# Patient Record
Sex: FEMALE | Race: OTHER | HISPANIC OR LATINO | URBAN - METROPOLITAN AREA
[De-identification: names, ages, dates, MRNs, and addresses within clinical notes are randomized per-mention and may not be internally consistent; named-entity substitution may affect disease eponyms.]

---

## 2017-11-22 ENCOUNTER — OUTPATIENT (OUTPATIENT)
Dept: OUTPATIENT SERVICES | Facility: HOSPITAL | Age: 32
LOS: 1 days | End: 2017-11-22

## 2017-11-22 VITALS
SYSTOLIC BLOOD PRESSURE: 100 MMHG | HEART RATE: 76 BPM | WEIGHT: 128.09 LBS | RESPIRATION RATE: 16 BRPM | TEMPERATURE: 98 F | DIASTOLIC BLOOD PRESSURE: 70 MMHG | HEIGHT: 64 IN

## 2017-11-22 DIAGNOSIS — M20.12 HALLUX VALGUS (ACQUIRED), LEFT FOOT: ICD-10-CM

## 2017-11-22 DIAGNOSIS — Z98.89 OTHER SPECIFIED POSTPROCEDURAL STATES: Chronic | ICD-10-CM

## 2017-11-22 DIAGNOSIS — Z98.890 OTHER SPECIFIED POSTPROCEDURAL STATES: Chronic | ICD-10-CM

## 2017-11-22 DIAGNOSIS — M21.619 BUNION OF UNSPECIFIED FOOT: ICD-10-CM

## 2017-11-22 LAB
BUN SERPL-MCNC: 14 MG/DL — SIGNIFICANT CHANGE UP (ref 7–23)
CALCIUM SERPL-MCNC: 9.4 MG/DL — SIGNIFICANT CHANGE UP (ref 8.4–10.5)
CHLORIDE SERPL-SCNC: 102 MMOL/L — SIGNIFICANT CHANGE UP (ref 98–107)
CO2 SERPL-SCNC: 24 MMOL/L — SIGNIFICANT CHANGE UP (ref 22–31)
CREAT SERPL-MCNC: 0.82 MG/DL — SIGNIFICANT CHANGE UP (ref 0.5–1.3)
GLUCOSE SERPL-MCNC: 86 MG/DL — SIGNIFICANT CHANGE UP (ref 70–99)
HCT VFR BLD CALC: 41.2 % — SIGNIFICANT CHANGE UP (ref 34.5–45)
HGB BLD-MCNC: 13.9 G/DL — SIGNIFICANT CHANGE UP (ref 11.5–15.5)
MCHC RBC-ENTMCNC: 31.2 PG — SIGNIFICANT CHANGE UP (ref 27–34)
MCHC RBC-ENTMCNC: 33.7 % — SIGNIFICANT CHANGE UP (ref 32–36)
MCV RBC AUTO: 92.4 FL — SIGNIFICANT CHANGE UP (ref 80–100)
NRBC # FLD: 0 — SIGNIFICANT CHANGE UP
PLATELET # BLD AUTO: 245 K/UL — SIGNIFICANT CHANGE UP (ref 150–400)
PMV BLD: 11.5 FL — SIGNIFICANT CHANGE UP (ref 7–13)
POTASSIUM SERPL-MCNC: 3.8 MMOL/L — SIGNIFICANT CHANGE UP (ref 3.5–5.3)
POTASSIUM SERPL-SCNC: 3.8 MMOL/L — SIGNIFICANT CHANGE UP (ref 3.5–5.3)
RBC # BLD: 4.46 M/UL — SIGNIFICANT CHANGE UP (ref 3.8–5.2)
RBC # FLD: 12.2 % — SIGNIFICANT CHANGE UP (ref 10.3–14.5)
SODIUM SERPL-SCNC: 140 MMOL/L — SIGNIFICANT CHANGE UP (ref 135–145)
WBC # BLD: 8.27 K/UL — SIGNIFICANT CHANGE UP (ref 3.8–10.5)
WBC # FLD AUTO: 8.27 K/UL — SIGNIFICANT CHANGE UP (ref 3.8–10.5)

## 2017-11-22 NOTE — H&P PST ADULT - PSH
Inguinal hernia, right  repair, 07/2003  S/P cystoscopy  lithotripsy 2014 for right kidney stone  S/P foot surgery, right  Lapidus 2012

## 2017-11-22 NOTE — H&P PST ADULT - RS GEN PE MLT RESP DETAILS PC
no chest wall tenderness/no rales/no wheezes/airway patent/breath sounds equal/good air movement/no rhonchi/no subcutaneous emphysema/no intercostal retractions/respirations non-labored/clear to auscultation bilaterally

## 2017-11-22 NOTE — H&P PST ADULT - PROBLEM SELECTOR PLAN 1
Pt is scheduled for left foot lapidus bunionectomy, 5th metatarsal osteotomy on 12/6/17.   Pre op instructions given and pt able to verbalize understanding.   Sterile cup given, pt instructed to bring urine sample AM of surgery for UCG and pt able to verbalize understanding.

## 2017-11-22 NOTE — H&P PST ADULT - NEGATIVE CARDIOVASCULAR SYMPTOMS
no paroxysmal nocturnal dyspnea/no orthopnea/no chest pain/no palpitations/no peripheral edema/no claudication/no dyspnea on exertion

## 2017-11-22 NOTE — H&P PST ADULT - LYMPHATIC
supraclavicular R/posterior cervical R/anterior cervical R/posterior cervical L/anterior cervical L/supraclavicular L

## 2017-11-22 NOTE — H&P PST ADULT - NEGATIVE NEUROLOGICAL SYMPTOMS
no transient paralysis/no vertigo/no difficulty walking/no weakness/no generalized seizures/no loss of sensation

## 2017-11-22 NOTE — H&P PST ADULT - HISTORY OF PRESENT ILLNESS
31 yo female with PMH kidney stones presents to pre surgical testing.  Pt reports she has been experiencing right foot pain for over 20 years, progressively getting worse.  Pt reports left foot xrays done in 5/2017.  Pt is scheduled for left foot lapidus bunionectomy, 5th metatarsal osteotomy on 12/6/17.

## 2017-11-22 NOTE — H&P PST ADULT - NEGATIVE MUSCULOSKELETAL SYMPTOMS
no myalgia/no arm pain L/no arm pain R/no joint swelling/no muscle weakness/no arthralgia/no leg pain R/no muscle cramps/no stiffness/no neck pain

## 2017-11-22 NOTE — H&P PST ADULT - NSANTHOSAYNRD_GEN_A_CORE
No. FRANSISCO screening performed.  STOP BANG Legend: 0-2 = LOW Risk; 3-4 = INTERMEDIATE Risk; 5-8 = HIGH Risk

## 2017-11-22 NOTE — H&P PST ADULT - FAMILY HISTORY
Grandparent  Still living? No  Family history of diabetes mellitus, Age at diagnosis: Age Unknown  Family history of ischemic heart disease, Age at diagnosis: Age Unknown     Aunt  Still living? Yes, Estimated age: Age Unknown  Family history of diabetes mellitus, Age at diagnosis: Age Unknown

## 2017-12-06 ENCOUNTER — RESULT REVIEW (OUTPATIENT)
Age: 32
End: 2017-12-06

## 2017-12-06 ENCOUNTER — OUTPATIENT (OUTPATIENT)
Dept: OUTPATIENT SERVICES | Facility: HOSPITAL | Age: 32
LOS: 1 days | Discharge: HOME | End: 2017-12-06
Payer: COMMERCIAL

## 2017-12-06 ENCOUNTER — TRANSCRIPTION ENCOUNTER (OUTPATIENT)
Age: 32
End: 2017-12-06

## 2017-12-06 VITALS
OXYGEN SATURATION: 100 % | TEMPERATURE: 98 F | WEIGHT: 128.09 LBS | SYSTOLIC BLOOD PRESSURE: 118 MMHG | HEIGHT: 64 IN | DIASTOLIC BLOOD PRESSURE: 65 MMHG | HEART RATE: 85 BPM | RESPIRATION RATE: 14 BRPM

## 2017-12-06 VITALS
HEART RATE: 85 BPM | SYSTOLIC BLOOD PRESSURE: 104 MMHG | TEMPERATURE: 98 F | OXYGEN SATURATION: 98 % | DIASTOLIC BLOOD PRESSURE: 73 MMHG | RESPIRATION RATE: 16 BRPM

## 2017-12-06 DIAGNOSIS — Z98.89 OTHER SPECIFIED POSTPROCEDURAL STATES: Chronic | ICD-10-CM

## 2017-12-06 DIAGNOSIS — Z98.890 OTHER SPECIFIED POSTPROCEDURAL STATES: Chronic | ICD-10-CM

## 2017-12-06 DIAGNOSIS — M20.12 HALLUX VALGUS (ACQUIRED), LEFT FOOT: ICD-10-CM

## 2017-12-06 PROCEDURE — 88311 DECALCIFY TISSUE: CPT | Mod: 26

## 2017-12-06 PROCEDURE — 88304 TISSUE EXAM BY PATHOLOGIST: CPT | Mod: 26

## 2017-12-06 PROCEDURE — 73630 X-RAY EXAM OF FOOT: CPT | Mod: 26,LT

## 2017-12-06 NOTE — ASU DISCHARGE PLAN (ADULT/PEDIATRIC). - SPECIAL INSTRUCTIONS
keep dressing clean , dry and intact   non weight bearing to left foot in posterior splint with crutches

## 2017-12-06 NOTE — ASU DISCHARGE PLAN (ADULT/PEDIATRIC). - INSTRUCTIONS
for 1 week progress slowly,avoid any foods that are spicy, greasy or fatty, increase fluids and resume regular diet in am

## 2017-12-06 NOTE — BRIEF OPERATIVE NOTE - OPERATION/FINDINGS
L foot medial 1st met bumpectomy and Lapidus with 1 x 4.0 36mm fully threaded screw and 4-hole plate with 2.7 screws (16mm, 18mm, 14mm, and 10mm). L foot 5th met medial distal closing base wedge with 2.0 10mm/8mm screw fixation

## 2017-12-06 NOTE — ASU DISCHARGE PLAN (ADULT/PEDIATRIC). - NURSING INSTRUCTIONS
Narcotic pain medicine is constipating , Buy over the counter stool softener and take as instructed on the bottle.   DONOT TAKE TYLENOL WITH THE PAIN MEDICINE AS THERE IS TYLENOL IN THE PAIN MEDICINE.

## 2017-12-06 NOTE — ASU DISCHARGE PLAN (ADULT/PEDIATRIC). - NOTIFY
Unable to Urinate/Pain not relieved by Medications/Persistent Nausea and Vomiting/Bleeding that does not stop/Swelling that continues/Numbness, color, or temperature change to extremity/Fever greater than 101

## 2017-12-06 NOTE — BRIEF OPERATIVE NOTE - PROCEDURE
<<-----Click on this checkbox to enter Procedure Carlo's bunionectomy of left foot  12/06/2017    Active  ASIM L[place]ato bunionectomy of left great toe  12/06/2017    Active  ASIM

## 2017-12-12 LAB — SURGICAL PATHOLOGY STUDY: SIGNIFICANT CHANGE UP

## 2018-06-22 ENCOUNTER — EMERGENCY (EMERGENCY)
Facility: HOSPITAL | Age: 33
LOS: 1 days | Discharge: HOME | End: 2018-06-22
Attending: EMERGENCY MEDICINE
Payer: COMMERCIAL

## 2018-06-22 VITALS
SYSTOLIC BLOOD PRESSURE: 135 MMHG | HEART RATE: 105 BPM | OXYGEN SATURATION: 100 % | RESPIRATION RATE: 18 BRPM | DIASTOLIC BLOOD PRESSURE: 78 MMHG | TEMPERATURE: 99 F

## 2018-06-22 VITALS — WEIGHT: 126.1 LBS | HEIGHT: 64 IN

## 2018-06-22 DIAGNOSIS — Z98.89 OTHER SPECIFIED POSTPROCEDURAL STATES: Chronic | ICD-10-CM

## 2018-06-22 DIAGNOSIS — Z98.890 OTHER SPECIFIED POSTPROCEDURAL STATES: Chronic | ICD-10-CM

## 2018-06-22 LAB
BASOPHILS # BLD AUTO: 0 K/UL — SIGNIFICANT CHANGE UP (ref 0–0.2)
BASOPHILS NFR BLD AUTO: 0.6 % — SIGNIFICANT CHANGE UP (ref 0–2)
EOSINOPHIL # BLD AUTO: 0 K/UL — SIGNIFICANT CHANGE UP (ref 0–0.5)
EOSINOPHIL NFR BLD AUTO: 0.5 % — SIGNIFICANT CHANGE UP (ref 0–6)
HCG UR QL: NEGATIVE — SIGNIFICANT CHANGE UP
HCT VFR BLD CALC: 43.5 % — SIGNIFICANT CHANGE UP (ref 37–47)
HGB BLD-MCNC: 14.3 G/DL — SIGNIFICANT CHANGE UP (ref 12–16)
LYMPHOCYTES # BLD AUTO: 1.8 K/UL — SIGNIFICANT CHANGE UP (ref 1–4.8)
LYMPHOCYTES # BLD AUTO: 22.4 % — SIGNIFICANT CHANGE UP (ref 20–55)
MCHC RBC-ENTMCNC: 29.5 PG — SIGNIFICANT CHANGE UP (ref 27–31)
MCHC RBC-ENTMCNC: 32.9 G/DL — SIGNIFICANT CHANGE UP (ref 32–36)
MCV RBC AUTO: 89.9 FL — SIGNIFICANT CHANGE UP (ref 81–99)
MONOCYTES # BLD AUTO: 0.4 K/UL — SIGNIFICANT CHANGE UP (ref 0–0.8)
MONOCYTES NFR BLD AUTO: 5.5 % — SIGNIFICANT CHANGE UP (ref 3–10)
NEUTROPHILS # BLD AUTO: 5.7 K/UL — SIGNIFICANT CHANGE UP (ref 1.8–8)
NEUTROPHILS NFR BLD AUTO: 70.9 % — SIGNIFICANT CHANGE UP (ref 37–73)
PLATELET # BLD AUTO: 211 K/UL — SIGNIFICANT CHANGE UP (ref 150–400)
RBC # BLD: 4.84 M/UL — SIGNIFICANT CHANGE UP (ref 4.4–5.2)
RBC # FLD: 13 % — SIGNIFICANT CHANGE UP (ref 11–15.6)
WBC # BLD: 8 K/UL — SIGNIFICANT CHANGE UP (ref 4.8–10.8)
WBC # FLD AUTO: 8 K/UL — SIGNIFICANT CHANGE UP (ref 4.8–10.8)

## 2018-06-22 PROCEDURE — 96375 TX/PRO/DX INJ NEW DRUG ADDON: CPT

## 2018-06-22 PROCEDURE — 70450 CT HEAD/BRAIN W/O DYE: CPT

## 2018-06-22 PROCEDURE — 36415 COLL VENOUS BLD VENIPUNCTURE: CPT

## 2018-06-22 PROCEDURE — 85027 COMPLETE CBC AUTOMATED: CPT

## 2018-06-22 PROCEDURE — 99284 EMERGENCY DEPT VISIT MOD MDM: CPT

## 2018-06-22 PROCEDURE — 96374 THER/PROPH/DIAG INJ IV PUSH: CPT

## 2018-06-22 PROCEDURE — 99284 EMERGENCY DEPT VISIT MOD MDM: CPT | Mod: 25

## 2018-06-22 PROCEDURE — 70450 CT HEAD/BRAIN W/O DYE: CPT | Mod: 26

## 2018-06-22 PROCEDURE — 81025 URINE PREGNANCY TEST: CPT

## 2018-06-22 RX ORDER — ACETAMINOPHEN 500 MG
1 TABLET ORAL
Qty: 12 | Refills: 0 | OUTPATIENT
Start: 2018-06-22 | End: 2018-06-24

## 2018-06-22 RX ORDER — SODIUM CHLORIDE 9 MG/ML
1000 INJECTION INTRAMUSCULAR; INTRAVENOUS; SUBCUTANEOUS ONCE
Qty: 0 | Refills: 0 | Status: COMPLETED | OUTPATIENT
Start: 2018-06-22 | End: 2018-06-22

## 2018-06-22 RX ORDER — DEXAMETHASONE 0.5 MG/5ML
10 ELIXIR ORAL ONCE
Qty: 0 | Refills: 0 | Status: COMPLETED | OUTPATIENT
Start: 2018-06-22 | End: 2018-06-22

## 2018-06-22 RX ORDER — KETOROLAC TROMETHAMINE 30 MG/ML
30 SYRINGE (ML) INJECTION ONCE
Qty: 0 | Refills: 0 | Status: DISCONTINUED | OUTPATIENT
Start: 2018-06-22 | End: 2018-06-22

## 2018-06-22 RX ADMIN — SODIUM CHLORIDE 1000 MILLILITER(S): 9 INJECTION INTRAMUSCULAR; INTRAVENOUS; SUBCUTANEOUS at 16:36

## 2018-06-22 RX ADMIN — Medication 10 MILLIGRAM(S): at 16:36

## 2018-06-22 RX ADMIN — Medication 30 MILLIGRAM(S): at 16:36

## 2018-06-22 NOTE — ED PROVIDER NOTE - PROGRESS NOTE DETAILS
pt is sitting comfortably in chair and in no acute distress. pt informed of negative ct results. pt states that her headache had improved with medication given. pt is informed to follow up with neurologist. pt is sitting comfortably in chair and in no acute distress. pt informed of negative ct results. pt states that her headache has improved drastically with medication and fluids given. pt is informed to follow up with neurologist.

## 2018-06-22 NOTE — ED PROVIDER NOTE - EYES, MLM
Called and spoke with pt to let her know that Dr. Barfield would like her to start lamivudine 150 mg daily instead of entecavir. This medication should be affordable for pt. Repeat labs Mon. 10/9. Pt repeated and verbalized understanding.   Clear bilaterally, pupils equal, round and reactive to light.

## 2018-06-22 NOTE — ED PROVIDER NOTE - ATTENDING CONTRIBUTION TO CARE
seen with acp  c/o right periorbital headache for the last 3 days no nausea no vomiting  no neuro findingsimp cluster headache  will give toradol and decadron  ct negative  Headache improved  Agree with acps assessment hx and physical

## 2018-06-22 NOTE — ED PROVIDER NOTE - OBJECTIVE STATEMENT
31 yo female with a pmh of renal stones present with a headache that started on Tuesday. It started in her right orbital area and then radiated to her occipital area. She described it as a pulsatile consistent pain. She is able to have some relief only with Tylenol use. She has also been experiencing dizziness, fevers, and chills intermittently. She states to have been experiencing nausea, which has caused a decrease in her appetite, but no vomiting. She denies any injury or trauma to her head. Before the start of the headache the only thing she can note is having allergy testing done earlier that morning.  She denies any other symptoms including chill, congestion, cough, chest pain, or SOB.

## 2018-06-22 NOTE — ED ADULT NURSE NOTE - CHPI ED SYMPTOMS NEG
no confusion/no change in level of consciousness/no loss of consciousness/no blurred vision/no weakness

## 2018-06-22 NOTE — ED ADULT NURSE NOTE - CAS EDN DISCHARGE ASSESSMENT
No adverse reaction to first time med in ED/Awake/Alert and oriented to person, place and time/Patient baseline mental status/Symptoms improved

## 2018-06-22 NOTE — ED PROVIDER NOTE - MEDICAL DECISION MAKING DETAILS
31 yo female presents with headache. Labs and CT ordered to r/o infection or cerebral bleed. Pain managed with toradol and decadron.

## 2018-06-24 ENCOUNTER — EMERGENCY (EMERGENCY)
Facility: HOSPITAL | Age: 33
LOS: 1 days | Discharge: HOME | End: 2018-06-24
Attending: EMERGENCY MEDICINE
Payer: COMMERCIAL

## 2018-06-24 VITALS
SYSTOLIC BLOOD PRESSURE: 120 MMHG | TEMPERATURE: 100 F | OXYGEN SATURATION: 100 % | RESPIRATION RATE: 18 BRPM | HEART RATE: 93 BPM | DIASTOLIC BLOOD PRESSURE: 83 MMHG

## 2018-06-24 VITALS — WEIGHT: 126.1 LBS | HEIGHT: 64 IN

## 2018-06-24 DIAGNOSIS — Z98.89 OTHER SPECIFIED POSTPROCEDURAL STATES: Chronic | ICD-10-CM

## 2018-06-24 DIAGNOSIS — Z98.890 OTHER SPECIFIED POSTPROCEDURAL STATES: Chronic | ICD-10-CM

## 2018-06-24 LAB
ALBUMIN SERPL ELPH-MCNC: 4.7 G/DL — SIGNIFICANT CHANGE UP (ref 3.3–5.2)
ALP SERPL-CCNC: 50 U/L — SIGNIFICANT CHANGE UP (ref 40–120)
ALT FLD-CCNC: 11 U/L — SIGNIFICANT CHANGE UP
ANION GAP SERPL CALC-SCNC: 15 MMOL/L — SIGNIFICANT CHANGE UP (ref 5–17)
AST SERPL-CCNC: 12 U/L — SIGNIFICANT CHANGE UP
BASOPHILS # BLD AUTO: 0 K/UL — SIGNIFICANT CHANGE UP (ref 0–0.2)
BASOPHILS NFR BLD AUTO: 0.5 % — SIGNIFICANT CHANGE UP (ref 0–2)
BILIRUB SERPL-MCNC: 0.5 MG/DL — SIGNIFICANT CHANGE UP (ref 0.4–2)
BUN SERPL-MCNC: 6 MG/DL — LOW (ref 8–20)
CALCIUM SERPL-MCNC: 9.5 MG/DL — SIGNIFICANT CHANGE UP (ref 8.6–10.2)
CHLORIDE SERPL-SCNC: 104 MMOL/L — SIGNIFICANT CHANGE UP (ref 98–107)
CO2 SERPL-SCNC: 23 MMOL/L — SIGNIFICANT CHANGE UP (ref 22–29)
CREAT SERPL-MCNC: 0.65 MG/DL — SIGNIFICANT CHANGE UP (ref 0.5–1.3)
CRP SERPL-MCNC: <0.4 MG/DL — SIGNIFICANT CHANGE UP (ref 0–0.4)
EOSINOPHIL # BLD AUTO: 0 K/UL — SIGNIFICANT CHANGE UP (ref 0–0.5)
EOSINOPHIL NFR BLD AUTO: 0.4 % — SIGNIFICANT CHANGE UP (ref 0–6)
ERYTHROCYTE [SEDIMENTATION RATE] IN BLOOD: 5 MM/HR — SIGNIFICANT CHANGE UP (ref 0–20)
GLUCOSE SERPL-MCNC: 101 MG/DL — SIGNIFICANT CHANGE UP (ref 70–115)
HCT VFR BLD CALC: 39.3 % — SIGNIFICANT CHANGE UP (ref 37–47)
HGB BLD-MCNC: 12.9 G/DL — SIGNIFICANT CHANGE UP (ref 12–16)
LYMPHOCYTES # BLD AUTO: 1.3 K/UL — SIGNIFICANT CHANGE UP (ref 1–4.8)
LYMPHOCYTES # BLD AUTO: 16.3 % — LOW (ref 20–55)
MCHC RBC-ENTMCNC: 29.7 PG — SIGNIFICANT CHANGE UP (ref 27–31)
MCHC RBC-ENTMCNC: 32.8 G/DL — SIGNIFICANT CHANGE UP (ref 32–36)
MCV RBC AUTO: 90.6 FL — SIGNIFICANT CHANGE UP (ref 81–99)
MONOCYTES # BLD AUTO: 0.5 K/UL — SIGNIFICANT CHANGE UP (ref 0–0.8)
MONOCYTES NFR BLD AUTO: 6.3 % — SIGNIFICANT CHANGE UP (ref 3–10)
NEUTROPHILS # BLD AUTO: 6.1 K/UL — SIGNIFICANT CHANGE UP (ref 1.8–8)
NEUTROPHILS NFR BLD AUTO: 76.2 % — HIGH (ref 37–73)
PLATELET # BLD AUTO: 202 K/UL — SIGNIFICANT CHANGE UP (ref 150–400)
POTASSIUM SERPL-MCNC: 3.7 MMOL/L — SIGNIFICANT CHANGE UP (ref 3.5–5.3)
POTASSIUM SERPL-SCNC: 3.7 MMOL/L — SIGNIFICANT CHANGE UP (ref 3.5–5.3)
PROT SERPL-MCNC: 7.4 G/DL — SIGNIFICANT CHANGE UP (ref 6.6–8.7)
RBC # BLD: 4.34 M/UL — LOW (ref 4.4–5.2)
RBC # FLD: 13 % — SIGNIFICANT CHANGE UP (ref 11–15.6)
SODIUM SERPL-SCNC: 142 MMOL/L — SIGNIFICANT CHANGE UP (ref 135–145)
WBC # BLD: 8 K/UL — SIGNIFICANT CHANGE UP (ref 4.8–10.8)
WBC # FLD AUTO: 8 K/UL — SIGNIFICANT CHANGE UP (ref 4.8–10.8)

## 2018-06-24 PROCEDURE — 80053 COMPREHEN METABOLIC PANEL: CPT

## 2018-06-24 PROCEDURE — 86140 C-REACTIVE PROTEIN: CPT

## 2018-06-24 PROCEDURE — 86666 EHRLICHIA ANTIBODY: CPT

## 2018-06-24 PROCEDURE — 99284 EMERGENCY DEPT VISIT MOD MDM: CPT | Mod: 25

## 2018-06-24 PROCEDURE — 70544 MR ANGIOGRAPHY HEAD W/O DYE: CPT

## 2018-06-24 PROCEDURE — 70544 MR ANGIOGRAPHY HEAD W/O DYE: CPT | Mod: 26,59

## 2018-06-24 PROCEDURE — 96374 THER/PROPH/DIAG INJ IV PUSH: CPT

## 2018-06-24 PROCEDURE — 85027 COMPLETE CBC AUTOMATED: CPT

## 2018-06-24 PROCEDURE — 99284 EMERGENCY DEPT VISIT MOD MDM: CPT

## 2018-06-24 PROCEDURE — 70551 MRI BRAIN STEM W/O DYE: CPT

## 2018-06-24 PROCEDURE — 70551 MRI BRAIN STEM W/O DYE: CPT | Mod: 26

## 2018-06-24 PROCEDURE — 36415 COLL VENOUS BLD VENIPUNCTURE: CPT

## 2018-06-24 PROCEDURE — 85652 RBC SED RATE AUTOMATED: CPT

## 2018-06-24 RX ORDER — KETOROLAC TROMETHAMINE 30 MG/ML
30 SYRINGE (ML) INJECTION ONCE
Qty: 0 | Refills: 0 | Status: DISCONTINUED | OUTPATIENT
Start: 2018-06-24 | End: 2018-06-24

## 2018-06-24 RX ADMIN — Medication 30 MILLIGRAM(S): at 12:39

## 2018-06-24 NOTE — ED PROVIDER NOTE - OBJECTIVE STATEMENT
This patient is a 32 year old woman recently seen in the ER This patient is a 32 year old woman recently seen in the ER 2 days ago for headache who presents This patient is a 32 year old woman recently seen in the ER 2 days ago for headache who presents to the ER c/o persistent headache.  Associated symptoms include fever with Tmax 102 and dizziness. This patient is a 32 year old woman recently seen in the ER 2 days ago for headache who presents to the ER c/o persistent headache.  Associated symptoms include fever with Tmax 102 and dizziness.  She has been taking extra strength tylenol every 5-6 hours for pain.  The headache started 5 days ago a couple hours after allergy testing at the immunologist office and has been persistent.  The headache was originally on the right side of her head, associated with right ear pain and right eye pressure.  Since yesterday the pain has become temporal describes a pressure and squeezing of her head.  She denies sick contacts, recent travel, tick bites, mosquito bites, diplopia, vision loss, hearing loss, neck stiffness, and vomiting.

## 2018-06-24 NOTE — ED PROVIDER NOTE - NEUROLOGICAL, MLM
Alert and oriented, no focal deficits, no motor or sensory deficits.  Normal finger to nose and rapid alternating movements; normal gait.

## 2018-06-24 NOTE — ED PROVIDER NOTE - MEDICAL DECISION MAKING DETAILS
Persistent headache repeat visit to the ER with fever at home.  I had a detailed discussion with the patient and her mother about encephalitis/meningitis due to viral causes.  Patient has fever and headache but is well appearing with no neck stiffness.  No fever at this time in the ER.  I discussed performing a lumbar puncture however, patient refused procedure.  Will give medication for pain and check labs including tick panel.  Due to patient's prior non-existence history of headache and now with persistent headache and recent negative head CT will get MRI.

## 2018-06-24 NOTE — ED ADULT NURSE NOTE - OBJECTIVE STATEMENT
pt presents to Ed with AMES and fever. pt was seen in ED  a few days ago, dx with cluster headaches. pt c/o nausea, denies v/d a&ox3. will continue to monitor and reassess

## 2018-06-24 NOTE — ED PROVIDER NOTE - MUSCULOSKELETAL, MLM
Spine appears normal, range of motion is not limited, no muscle or joint tenderness.  No neck stiffness.  Neck supple.

## 2018-06-27 ENCOUNTER — INPATIENT (INPATIENT)
Facility: HOSPITAL | Age: 33
LOS: 4 days | Discharge: HOME | End: 2018-07-02
Attending: INTERNAL MEDICINE | Admitting: INTERNAL MEDICINE
Payer: SELF-PAY

## 2018-06-27 VITALS
SYSTOLIC BLOOD PRESSURE: 139 MMHG | HEART RATE: 96 BPM | DIASTOLIC BLOOD PRESSURE: 88 MMHG | TEMPERATURE: 99 F | RESPIRATION RATE: 16 BRPM | OXYGEN SATURATION: 100 %

## 2018-06-27 DIAGNOSIS — Z98.890 OTHER SPECIFIED POSTPROCEDURAL STATES: Chronic | ICD-10-CM

## 2018-06-27 DIAGNOSIS — Z98.89 OTHER SPECIFIED POSTPROCEDURAL STATES: Chronic | ICD-10-CM

## 2018-06-27 DIAGNOSIS — A87.9 VIRAL MENINGITIS, UNSPECIFIED: ICD-10-CM

## 2018-06-27 LAB
A PHAGOCYTOPH IGG TITR SER IF: SIGNIFICANT CHANGE UP TITER
ALBUMIN SERPL ELPH-MCNC: 4.7 G/DL — SIGNIFICANT CHANGE UP (ref 3.3–5)
ALP SERPL-CCNC: 47 U/L — SIGNIFICANT CHANGE UP (ref 40–120)
ALT FLD-CCNC: 12 U/L — SIGNIFICANT CHANGE UP (ref 4–33)
APPEARANCE UR: CLEAR — SIGNIFICANT CHANGE UP
AST SERPL-CCNC: 13 U/L — SIGNIFICANT CHANGE UP (ref 4–32)
B BURGDOR AB SER QL IA: NEGATIVE — SIGNIFICANT CHANGE UP
B MICROTI DNA BLD QL NAA+PROBE: NEGATIVE — SIGNIFICANT CHANGE UP
B MICROTI IGG TITR SER: SIGNIFICANT CHANGE UP TITER
BABESIA DNA SPEC QL NAA+PROBE: NEGATIVE — SIGNIFICANT CHANGE UP
BABESIA DNA SPEC QL NAA+PROBE: NEGATIVE — SIGNIFICANT CHANGE UP
BACTERIA # UR AUTO: SIGNIFICANT CHANGE UP
BASOPHILS # BLD AUTO: 0.05 K/UL — SIGNIFICANT CHANGE UP (ref 0–0.2)
BASOPHILS NFR BLD AUTO: 0.9 % — SIGNIFICANT CHANGE UP (ref 0–2)
BILIRUB SERPL-MCNC: 0.4 MG/DL — SIGNIFICANT CHANGE UP (ref 0.2–1.2)
BILIRUB UR-MCNC: NEGATIVE — SIGNIFICANT CHANGE UP
BLOOD UR QL VISUAL: HIGH
BUN SERPL-MCNC: 11 MG/DL — SIGNIFICANT CHANGE UP (ref 7–23)
CALCIUM SERPL-MCNC: 9.2 MG/DL — SIGNIFICANT CHANGE UP (ref 8.4–10.5)
CHLORIDE SERPL-SCNC: 101 MMOL/L — SIGNIFICANT CHANGE UP (ref 98–107)
CLARITY CSF: CLEAR — SIGNIFICANT CHANGE UP
CO2 SERPL-SCNC: 24 MMOL/L — SIGNIFICANT CHANGE UP (ref 22–31)
COLOR CSF: COLORLESS — SIGNIFICANT CHANGE UP
COLOR SPEC: YELLOW — SIGNIFICANT CHANGE UP
CREAT SERPL-MCNC: 0.81 MG/DL — SIGNIFICANT CHANGE UP (ref 0.5–1.3)
CSF PCR RESULT: SIGNIFICANT CHANGE UP
E CHAFFEENSIS IGG TITR SER IF: SIGNIFICANT CHANGE UP TITER
EOSINOPHIL # BLD AUTO: 0.14 K/UL — SIGNIFICANT CHANGE UP (ref 0–0.5)
EOSINOPHIL # CSF: 5 % — SIGNIFICANT CHANGE UP
EOSINOPHIL NFR BLD AUTO: 2.6 % — SIGNIFICANT CHANGE UP (ref 0–6)
GLUCOSE CSF-MCNC: 42 MG/DL — SIGNIFICANT CHANGE UP (ref 40–70)
GLUCOSE SERPL-MCNC: 95 MG/DL — SIGNIFICANT CHANGE UP (ref 70–99)
GLUCOSE UR-MCNC: NEGATIVE — SIGNIFICANT CHANGE UP
GRAM STN CSF: SIGNIFICANT CHANGE UP
HCT VFR BLD CALC: 41.5 % — SIGNIFICANT CHANGE UP (ref 34.5–45)
HGB BLD-MCNC: 13.4 G/DL — SIGNIFICANT CHANGE UP (ref 11.5–15.5)
IMM GRANULOCYTES # BLD AUTO: 0.01 # — SIGNIFICANT CHANGE UP
IMM GRANULOCYTES NFR BLD AUTO: 0.2 % — SIGNIFICANT CHANGE UP (ref 0–1.5)
KETONES UR-MCNC: SIGNIFICANT CHANGE UP
LEUKOCYTE ESTERASE UR-ACNC: NEGATIVE — SIGNIFICANT CHANGE UP
LYMPHOCYTES # BLD AUTO: 0.9 K/UL — LOW (ref 1–3.3)
LYMPHOCYTES # BLD AUTO: 16.9 % — SIGNIFICANT CHANGE UP (ref 13–44)
LYMPHOCYTES # CSF: 85 % — SIGNIFICANT CHANGE UP
MCHC RBC-ENTMCNC: 29.8 PG — SIGNIFICANT CHANGE UP (ref 27–34)
MCHC RBC-ENTMCNC: 32.3 % — SIGNIFICANT CHANGE UP (ref 32–36)
MCV RBC AUTO: 92.4 FL — SIGNIFICANT CHANGE UP (ref 80–100)
MONOCYTES # BLD AUTO: 0.36 K/UL — SIGNIFICANT CHANGE UP (ref 0–0.9)
MONOCYTES # CSF: 10 % — SIGNIFICANT CHANGE UP
MONOCYTES NFR BLD AUTO: 6.8 % — SIGNIFICANT CHANGE UP (ref 2–14)
MUCOUS THREADS # UR AUTO: SIGNIFICANT CHANGE UP
NEUTROPHILS # BLD AUTO: 3.86 K/UL — SIGNIFICANT CHANGE UP (ref 1.8–7.4)
NEUTROPHILS NFR BLD AUTO: 72.6 % — SIGNIFICANT CHANGE UP (ref 43–77)
NITRITE UR-MCNC: NEGATIVE — SIGNIFICANT CHANGE UP
NON-SQ EPI CELLS # UR AUTO: <1 — SIGNIFICANT CHANGE UP
NRBC # FLD: 0 — SIGNIFICANT CHANGE UP
NRBC NFR CSF: 294 CELL/UL — CRITICAL HIGH (ref 0–5)
PH UR: 6 — SIGNIFICANT CHANGE UP (ref 4.6–8)
PLATELET # BLD AUTO: 190 K/UL — SIGNIFICANT CHANGE UP (ref 150–400)
PMV BLD: 11.6 FL — SIGNIFICANT CHANGE UP (ref 7–13)
POTASSIUM SERPL-MCNC: 4 MMOL/L — SIGNIFICANT CHANGE UP (ref 3.5–5.3)
POTASSIUM SERPL-SCNC: 4 MMOL/L — SIGNIFICANT CHANGE UP (ref 3.5–5.3)
PROT CSF-MCNC: 94.9 MG/DL — HIGH (ref 15–40)
PROT SERPL-MCNC: 7.6 G/DL — SIGNIFICANT CHANGE UP (ref 6–8.3)
PROT UR-MCNC: 20 MG/DL — SIGNIFICANT CHANGE UP
RBC # BLD: 4.49 M/UL — SIGNIFICANT CHANGE UP (ref 3.8–5.2)
RBC # CSF: 2 CELL/UL — HIGH (ref 0–0)
RBC # FLD: 12.3 % — SIGNIFICANT CHANGE UP (ref 10.3–14.5)
RBC CASTS # UR COMP ASSIST: HIGH (ref 0–?)
SODIUM SERPL-SCNC: 139 MMOL/L — SIGNIFICANT CHANGE UP (ref 135–145)
SP GR SPEC: 1.03 — SIGNIFICANT CHANGE UP (ref 1–1.04)
SPECIMEN SOURCE: SIGNIFICANT CHANGE UP
SQUAMOUS # UR AUTO: SIGNIFICANT CHANGE UP
TOTAL CELLS COUNTED, SPINAL FLUID: 100 CELLS — SIGNIFICANT CHANGE UP
UROBILINOGEN FLD QL: NORMAL MG/DL — SIGNIFICANT CHANGE UP
WBC # BLD: 5.32 K/UL — SIGNIFICANT CHANGE UP (ref 3.8–10.5)
WBC # FLD AUTO: 5.32 K/UL — SIGNIFICANT CHANGE UP (ref 3.8–10.5)
WBC UR QL: SIGNIFICANT CHANGE UP (ref 0–?)
XANTHOCHROMIA: SIGNIFICANT CHANGE UP

## 2018-06-27 RX ORDER — METOCLOPRAMIDE HCL 10 MG
10 TABLET ORAL ONCE
Qty: 0 | Refills: 0 | Status: COMPLETED | OUTPATIENT
Start: 2018-06-27 | End: 2018-06-27

## 2018-06-27 RX ORDER — KETOROLAC TROMETHAMINE 30 MG/ML
15 SYRINGE (ML) INJECTION ONCE
Qty: 0 | Refills: 0 | Status: DISCONTINUED | OUTPATIENT
Start: 2018-06-27 | End: 2018-06-27

## 2018-06-27 RX ORDER — ACETAMINOPHEN 500 MG
975 TABLET ORAL ONCE
Qty: 0 | Refills: 0 | Status: COMPLETED | OUTPATIENT
Start: 2018-06-27 | End: 2018-06-27

## 2018-06-27 RX ORDER — SODIUM CHLORIDE 9 MG/ML
1000 INJECTION INTRAMUSCULAR; INTRAVENOUS; SUBCUTANEOUS ONCE
Qty: 0 | Refills: 0 | Status: COMPLETED | OUTPATIENT
Start: 2018-06-27 | End: 2018-06-27

## 2018-06-27 RX ADMIN — SODIUM CHLORIDE 2000 MILLILITER(S): 9 INJECTION INTRAMUSCULAR; INTRAVENOUS; SUBCUTANEOUS at 12:10

## 2018-06-27 RX ADMIN — Medication 10 MILLIGRAM(S): at 13:10

## 2018-06-27 RX ADMIN — Medication 975 MILLIGRAM(S): at 13:10

## 2018-06-27 RX ADMIN — Medication 15 MILLIGRAM(S): at 22:19

## 2018-06-27 NOTE — H&P ADULT - NSHPREVIEWOFSYSTEMS_GEN_ALL_CORE
REVIEW OF SYSTEMS    General:  Fever, poor appetite  Skin/Breast:  Negative  Ophthalmologic:  Negative  ENMT:  Negative  Respiratory and Thorax:  Negative  Cardiovascular:  Negative  Gastrointestinal:  Negative  Genitourinary:  Negative  Musculoskeletal:  myalgia after arrival to ED  Neurological:  Headache  Psychiatric:  Negative  Hematology/Lymphatics:  Negative	  Endocrine:	  Negative  Allergic/Immunologic:	 Negative

## 2018-06-27 NOTE — ED PROVIDER NOTE - NS ED ROS FT
GENERAL: +fevers/chills, EYES: no change in vision, HEENT: no trouble swallowing or speaking, CARDIAC: no chest pain, PULMONARY: no cough or SOB, GI: no abdominal pain, no nausea, no vomiting, no diarrhea or constipation, : No changes in urination, SKIN: no rashes, NEURO: +headache, no photophobia, MSK: No joint pain ~Huang Jang M.D. Resident

## 2018-06-27 NOTE — ED PROVIDER NOTE - PHYSICAL EXAMINATION
Gen: AAOx3, non-toxic  Head: NCAT  HEENT: EOMI, mucus membranes dry, normal conjunctiva, PERRL, EOMi  Lung: CTAB, no respiratory distress, no wheezes/rhonchi/rales B/L, speaking in full sentences  CV: RRR, no murmurs, rubs or gallops  Abd: soft, NTND, no guarding  MSK: no visible deformities  Neuro: No focal sensory or motor deficits, +5/5 upper and lower ext strength, CN II-XII intact, neck supple  Skin: Warm, well perfused, no rash  Psych: normal affect.   ~Huang Jang M.D. Resident

## 2018-06-27 NOTE — ED ADULT TRIAGE NOTE - CHIEF COMPLAINT QUOTE
Pt. c/o headaches, fevers (Tmax 102), nausea and weakness x 1 week. States pain radiates down spine but denies stiffness off neck. Went to ER twice last week. CT and MRI of head was negative. Sent by Neurologist to r/o Meningitis.

## 2018-06-27 NOTE — H&P ADULT - NSHPLABSRESULTS_GEN_ALL_CORE
139  |  101  |  11  ----------------------------<  95  4.0   |  24  |  0.81    Ca    9.2      2018 11:50    TPro  7.6  /  Alb  4.7  /  TBili  0.4  /  DBili  x   /  AST  13  /  ALT  12  /  AlkPhos  47                              13.4   5.32  )-----------( 190      ( 2018 11:50 )             41.5         Spinal Fluid Differential (18 @ 14:27)    Lymphocyte Count, CSF: 85 %  Glucose, CSF (18 @ 14:27)    Glucose, CSF: 42 mg/dL  Protein, CSF (18 @ 14:27)    Protein, CSF: 94.9 mg/dL      Culture - CSF with Gram Stain . (18 @ 17:59)    Gram Stain Spinal Fluid:   NOS^No Organisms Seen  WBC^White Blood Cells  QNTY CELLS IN GRAM STAIN: FEW (2+)    Specimen Source: CEREBRAL SPINAL FLUID      Urinalysis Basic - ( 2018 11:56 )    Color: YELLOW / Appearance: CLEAR / S.027 / pH: 6.0  Gluc: NEGATIVE / Ketone: SMALL  / Bili: NEGATIVE / Urobili: NORMAL mg/dL   Blood: TRACE / Protein: 20 mg/dL / Nitrite: NEGATIVE   Leuk Esterase: NEGATIVE / RBC: 5-10 / WBC 2-5   Sq Epi: OCC / Non Sq Epi: x / Bacteria: FEW

## 2018-06-27 NOTE — ED ADULT NURSE NOTE - OBJECTIVE STATEMENT
Pt received in spot 8. Alert and oriented x3, ambulatory. Co headaches, fevers, nausea x1 week. Co pain to right side of neck. States she has been alternating between Tylenol and Motrin. Denies photophobia, no nuchal rigidity noted. Pt has been to ED twice for same complaint. Outpatient neurologist sent patient to ED for spinal tap to ro meningitis. Labs sent. IV placed. VS as stated. Comfort measures provided. Will continue to monitor.

## 2018-06-27 NOTE — H&P ADULT - NSHPPHYSICALEXAM_GEN_ALL_CORE
Vital Signs Last 24 Hrs  T(C): 37.1 (27 Jun 2018 23:02), Max: 37.4 (27 Jun 2018 17:43)  T(F): 98.8 (27 Jun 2018 23:02), Max: 99.3 (27 Jun 2018 17:43)  HR: 79 (27 Jun 2018 23:02) (65 - 96)  BP: 104/66 (27 Jun 2018 23:02) (104/66 - 139/88)  BP(mean): --  RR: 18 (27 Jun 2018 23:02) (16 - 18)  SpO2: 98% (27 Jun 2018 23:02) (98% - 100%)    PHYSICAL EXAM:  GENERAL: No Acute Distress  HEAD:  Atraumatic, Normocephalic  EYES: EOMI, PERRL, conjunctiva and sclera clear  ENMT: Moist mucous membranes   NECK: Supple, no stiffness  CHEST/LUNG: Clear to auscultation bilaterally  HEART: Regular rate and rhythm; No murmurs, rubs, or gallops  ABDOMEN: Soft, Nontender, Nondistended; Bowel sounds normal  EXTREMITIES:   No clubbing, cyanosis, or pedal edema  VASCULAR: Normal DP pulses  PSYCH: Normal Affect, Normal Behavior  NEUROLOGY:   - Mental status A&O x 3,  SKIN: No rashes or lesions  MUSCULOSKELETAL: No joint swelling

## 2018-06-27 NOTE — ED PROVIDER NOTE - OBJECTIVE STATEMENT
33 yo F no significant PMHx p/w headache. Patient's headache began on June 19th after she was at the allergist for allergy testing. She went to the ER initially at [facility] on June 22. At that time a CT head was done with showed no acute intracranial pathology. She was discharged home with diagnosis of cluster headaches. Her headache persisted and she went back to the ER on June 24. She was also having fevers at home. An MRA was done which was negative and she was sent home with neurology follow up and a prescription of Fioricet. She went to a neurologist this AM and was sent to the ER for evaluation of meningitis. She took her fioricet this AM which improved her headache but she continues to have body aches/chills and does not feel well. She denies neck pain but states she has pain down the right side of her shoulder/back. She feels nauseous at times but denies emesis. Denies CP/palpitations, weakness/numbness to extremities, photophobia.

## 2018-06-27 NOTE — ED PROVIDER NOTE - ATTENDING CONTRIBUTION TO CARE
Patient presents with constant ha x 8 days, initially r. side/throbbing, severe, mild relief with fiorecet, a/w intermittent low grade fevers, today more frontal, initially seen and evaluated dx with cluster ha, had ct and mri done with no acute findings. No associated vision changes, weakness, numbness, imbalance, cp, sob, nausea, vomiting, diarrhea, dysuria. SYmptoms started hours after having allergy testing. No recent travel, sick contacts, hiking, bug bites.  exam  GEN - NAD; well appearing; A+O x3   HEAD - NC/AT   EYES- PERRL, EOMI  ENT: Airway patent, mmm, Oral cavity and pharynx normal. No inflammation, swelling, exudate, or lesions.  NECK: Neck supple, non-tender without lymphadenopathy, no masses.  PULMONARY - CTA b/l, symmetric breath sounds.   CARDIAC -s1s2, RRR, no M,G,R  ABDOMEN - +BS, ND, NT, soft, no guarding, no rebound, no masses   BACK - no CVA tenderness, Normal  spine   EXTREMITIES - FROM, symmetric pulses, capillary refill < 2 seconds, no edema   SKIN - no rash or bruising   NEUROLOGIC - ao3, cn2-12 intact, 5/5 strength in all extremities, sensation grossly intact, f-n nl, no dysdiadochokinesia, gait steady  PSYCH -nl mood/affect, nl insight.  a/p-patient with ha x 8 days, sent in by neurologist for lp after negative ct/mri, no neck pain or stiffness, well appearing, afebrile, low suspicion for bacterial meningitis, but given persistent symptoms. will check labs, pain ctrl, d/w her neurologist poss lp, reass. Patient presents with constant ha x 8 days, initially r. side/throbbing, severe, mild relief with fiorecet, a/w intermittent low grade fevers, today more frontal, initially seen and evaluated dx with cluster ha, had ct and mri done with no acute findings. No associated vision changes, weakness, numbness, imbalance, cp, sob, nausea, vomiting, diarrhea, dysuria. SYmptoms started hours after having allergy testing. No recent travel, sick contacts, hiking, bug bites.  exam  GEN - NAD; well appearing; A+O x3   HEAD - NC/AT   EYES- PERRL, EOMI  ENT: Airway patent, mmm, Oral cavity and pharynx normal. No inflammation, swelling, exudate, or lesions.  NECK: Neck supple, non-tender without lymphadenopathy, no masses.  PULMONARY - CTA b/l, symmetric breath sounds.   CARDIAC -s1s2, RRR, no M,G,R  ABDOMEN - +BS, ND, NT, soft, no guarding, no rebound, no masses   BACK - no CVA tenderness, Normal  spine   EXTREMITIES - FROM, symmetric pulses, capillary refill < 2 seconds, no edema   SKIN - no rash or bruising   NEUROLOGIC - ao3, cn2-12 intact, 5/5 strength in all extremities, sensation grossly intact, f-n nl, no dysdiadochokinesia, gait steady  PSYCH -nl mood/affect, nl insight.  a/p-patient with ha x 8 days, sent in by neurologist for lp after negative ct/mri, no neck pain or stiffness, well appearing, afebrile, low suspicion for bacterial meningitis, but given persistent symptoms. will check labs, pain ctrl, lp, reass.

## 2018-06-27 NOTE — ED PROVIDER NOTE - PROGRESS NOTE DETAILS
Huang Jang M.D. Resident: LP done, opening pressure 18 mm Hg. Huang Jang M.D. Resident: Patient's neurologist, Dr. Everett called, left callback number. Kia: discussed LP results and plan to admit. Picture most c/w viral meningitis, pt non-toxic appearing, will hold abx for now. PMD: Sejal ([facility]). Kia: discussed LP results and plan to admit. Clinical picture c/w viral meningitis, pt non-toxic appearing, will hold abx. PMD: Sejal ([facility]).

## 2018-06-27 NOTE — ED PROVIDER NOTE - MEDICAL DECISION MAKING DETAILS
33 yo F no significant PMHx p/w headache x 9 days, on exam no nuchal rigidity or photophobia, +fevers at home, appears dehydrated, will reach out to neurologist and reevaluate

## 2018-06-27 NOTE — H&P ADULT - ASSESSMENT
33 y/o F p/w persistent headache and fever.  CSF analysis shows cell count ~300 with lymphocyte predominance, high protein and normal glucose.  No bacteria see on gram stain.

## 2018-06-27 NOTE — H&P ADULT - HISTORY OF PRESENT ILLNESS
31 y/o F with no PMH p/w headache and fever for the past week.  Pt has had occipital headache and upper neck pain for the past week.  She has also noticed fever.  She had nausea without vomiting, and mild photophobia.  She initially presented to the ED 6/22 and had blood work and CT head done which were normal.  She was discharged after symptoms improved with Toradol and IVF.  She had continued symptoms and returned to ED 6/24.  MRI done that visit was normal.  She had declined LP at that time.  Symptoms persisted, and she returned today.  Pt has not had any travel or sick contacts.  No preceding URI symptoms.  No h/o recurrent infections.  Denies neck stiffness.  On the day symptoms began, pt had allergy skin testing 2/2 occasional rash.      In ED, today, pt was given Reglan, Toradol, and Tylenol.  LP was performed.

## 2018-06-28 DIAGNOSIS — G03.0 NONPYOGENIC MENINGITIS: ICD-10-CM

## 2018-06-28 LAB

## 2018-06-28 PROCEDURE — 99254 IP/OBS CNSLTJ NEW/EST MOD 60: CPT | Mod: GC

## 2018-06-28 RX ORDER — SODIUM CHLORIDE 9 MG/ML
1000 INJECTION INTRAMUSCULAR; INTRAVENOUS; SUBCUTANEOUS
Qty: 0 | Refills: 0 | Status: COMPLETED | OUTPATIENT
Start: 2018-06-28 | End: 2018-06-28

## 2018-06-28 RX ORDER — ACETAMINOPHEN 500 MG
650 TABLET ORAL EVERY 6 HOURS
Qty: 0 | Refills: 0 | Status: DISCONTINUED | OUTPATIENT
Start: 2018-06-28 | End: 2018-07-02

## 2018-06-28 RX ORDER — IBUPROFEN 200 MG
400 TABLET ORAL ONCE
Qty: 0 | Refills: 0 | Status: COMPLETED | OUTPATIENT
Start: 2018-06-28 | End: 2018-06-28

## 2018-06-28 RX ADMIN — SODIUM CHLORIDE 100 MILLILITER(S): 9 INJECTION INTRAMUSCULAR; INTRAVENOUS; SUBCUTANEOUS at 21:02

## 2018-06-28 RX ADMIN — Medication 650 MILLIGRAM(S): at 14:40

## 2018-06-28 RX ADMIN — Medication 650 MILLIGRAM(S): at 07:20

## 2018-06-28 RX ADMIN — Medication 650 MILLIGRAM(S): at 15:00

## 2018-06-28 RX ADMIN — Medication 650 MILLIGRAM(S): at 21:01

## 2018-06-28 RX ADMIN — Medication 650 MILLIGRAM(S): at 22:15

## 2018-06-28 RX ADMIN — Medication 400 MILLIGRAM(S): at 23:10

## 2018-06-28 RX ADMIN — SODIUM CHLORIDE 100 MILLILITER(S): 9 INJECTION INTRAMUSCULAR; INTRAVENOUS; SUBCUTANEOUS at 01:28

## 2018-06-28 RX ADMIN — SODIUM CHLORIDE 100 MILLILITER(S): 9 INJECTION INTRAMUSCULAR; INTRAVENOUS; SUBCUTANEOUS at 12:48

## 2018-06-28 RX ADMIN — Medication 650 MILLIGRAM(S): at 06:31

## 2018-06-28 NOTE — CONSULT NOTE ADULT - ATTENDING COMMENTS
Aseptic meningitis in 33 yo woman who has had fever and headache x 9 days following allergy skin testing.    CSF PCR neg.  CSF culture testing.   h/o multiple antibiotic allergies.    check HIV, [place], syphilis, crypt serology  follow CSF culture  BRAN  consider non infectious etiologies- neuro eval  d/c droplet

## 2018-06-28 NOTE — CONSULT NOTE ADULT - ASSESSMENT
Assessment: 33 yo F, previously healthy, presents with 8 day history of unilateral headache with associated fever, nausea and photophobia. Now found to have CSF consistent with viral meningitis of unclear source; there were no sick contact nor any occupational and environmental exposures. Patient defervesced and is improving symptomatically on IVF and PRN antipyretics. Assessment: 33 yo F, previously healthy, presents with 8 day history of unilateral headache with associated fever, nausea and photophobia. Now found to have CSF consistent with aseptic meningitis. CSF PCR and lyme, anaplasma & babesia serologies all negative. Unclear source. Possible autoimmune (?) given her reactions to antibiotics. Medication related (?)    -continue supportive care  -HIV and [place] serology  -Cryptococcus serum antigen  -f/u CSF VDRL  -d/c droplet precaution  -BRAN  -recommend neurology consult

## 2018-06-28 NOTE — PROGRESS NOTE ADULT - SUBJECTIVE AND OBJECTIVE BOX
Patient is a 32y old  Female who presents with a chief complaint of Headache (2018 23:53)      SUBJECTIVE / OVERNIGHT EVENTS:    HA and fever subsided s/p tylenol    Vital Signs Last 24 Hrs  T(C): 36.8 (2018 10:19), Max: 37.4 (2018 17:43)  T(F): 98.3 (2018 10:19), Max: 99.3 (2018 17:43)  HR: 87 (2018 10:19) (65 - 87)  BP: 111/74 (2018 10:19) (104/66 - 115/66)  BP(mean): --  RR: 18 (2018 10:19) (17 - 18)  SpO2: 98% (2018 10:19) (98% - 100%)  I&O's Summary      GENERAL: NAD, AAOx3  HEAD:  Atraumatic, Normocephalic  EYES: EOMI, PERRLA, conjunctiva and sclera clear  NECK: Supple, No JVD, No LAD  CHEST/LUNG: Clear to auscultation bilaterally; No wheeze  HEART: Regular rate and rhythm; No murmurs, rubs, or gallops  ABDOMEN: Soft, Nontender, Nondistended; Bowel sounds present  EXTREMITIES:  2+ Peripheral Pulses, No clubbing, cyanosis, or edema  SKIN: No rashes or lesions  NEURO: nonfocal CN/motor/sensory/reflexes    LABS:                        13.4   5.32  )-----------( 190      ( 2018 11:50 )             41.5     06-27    139  |  101  |  11  ----------------------------<  95  4.0   |  24  |  0.81    Ca    9.2      2018 11:50    TPro  7.6  /  Alb  4.7  /  TBili  0.4  /  DBili  x   /  AST  13  /  ALT  12  /  AlkPhos  47  06-27      CAPILLARY BLOOD GLUCOSE            Urinalysis Basic - ( 2018 11:56 )    Color: YELLOW / Appearance: CLEAR / S.027 / pH: 6.0  Gluc: NEGATIVE / Ketone: SMALL  / Bili: NEGATIVE / Urobili: NORMAL mg/dL   Blood: TRACE / Protein: 20 mg/dL / Nitrite: NEGATIVE   Leuk Esterase: NEGATIVE / RBC: 5-10 / WBC 2-5   Sq Epi: OCC / Non Sq Epi: x / Bacteria: FEW        RADIOLOGY & ADDITIONAL TESTS:    Imaging Personally Reviewed:  [x] YES  [ ] NO    Consultant(s) Notes Reviewed:  [x] YES  [ ] NO      MEDICATIONS  (STANDING):  sodium chloride 0.9%. 1000 milliLiter(s) (100 mL/Hr) IV Continuous <Continuous>    MEDICATIONS  (PRN):  acetaminophen   Tablet. 650 milliGRAM(s) Oral every 6 hours PRN mild, moderate, or severe pain      Care Discussed with Consultants/Other Providers [x] YES  [ ] NO    HEALTH ISSUES - PROBLEM Dx:  Aseptic meningitis: Aseptic meningitis

## 2018-06-28 NOTE — CONSULT NOTE ADULT - SUBJECTIVE AND OBJECTIVE BOX
HPI:  31 y/o F, previously healthy, p/w with isolated right sided headache down to upper neck and fever up to 102.8F since . t week.  Pt also endorses to poor appetite with associated nausea and photophobia. She initially presented to the ED  with negative CT head, and sent home after IVF and Toradol. She was diagnosed with cluster headache and told to make an appointment with neurologist. Symptoms persisted and she re-presented on  to the ED, where MRI had and MRA head were both normal. Patient declined LP at that time, and was sent home again. Pt continues to have nonresolving headache and went to the [facility] on .   In the ED, pt given Reglan, Toradol and Tylenol. She was afebrile, non-tachy, and normotensive. LP was performed that showed elevated protein (94.9) with normal glucose (42), elevated cell count to 294 (85% lymphocytes) and negative gram stain.   Patient denies rhinorrhea, ear ache, neck stiffness, upper respiratory symptoms, GI,  complaints, new skin rash nor myalgia. She denies recent travel (was in [place] 8 month ago), h/o lyme, rodent exposure, sick contacts nor new medications.     PAST MEDICAL & SURGICAL HISTORY:  Calculus of kidney  Bunion  Hallux valgus  S/P foot surgery, right: Lapidus   S/P cystoscopy: lithotripsy  for right kidney stone  Inguinal hernia, right: repair, 2003    Allergies    cefuroxime (Rash)  Cipro (Rash)  Diflucan (Rash)  Sudafed (Other)    Intolerances    ANTIMICROBIALS:      OTHER MEDS:  acetaminophen   Tablet. 650 milliGRAM(s) Oral every 6 hours PRN  sodium chloride 0.9%. 1000 milliLiter(s) IV Continuous <Continuous>      SOCIAL HISTORY:    Marital Status:  (  X )    (  ) Single    (   )    (  )   Occupation:   Lives with: (  ) alone  (  ) children   ( X ) spouse   (  ) parents  (  ) other    Substance Use (street drugs): ( X ) never used  (  ) other:  Tobacco Usage:  ( X ) never smoked   (   ) former smoker   (   ) current smoker  (     ) pack year  (        ) last cigarette date  Alcohol Usage: Denies EtOH use    FAMILY HISTORY:  Family history of ischemic heart disease (Grandparent)  Family history of diabetes mellitus (Grandparent, Aunt)      ROS:  Unobtainable because:   All other systems negative     Constitutional: fever and chills upon presentation, no weight loss, no night sweats  HEENT:  no vision changes, no sore throat, no rhinorrhea  Cardiovascular:  no chest pain, no palpitation  Respiratory:  no SOB, no cough  GI:  no abd pain, no vomiting, no diarrhea, endorses to nausea and poor appetite  urinary: no dysuria, no hematuria, no flank pain  musculoskeletal:  no joint pain, no joint swelling  skin:  no rash  neurology:  no seizure, no change in mental status    Physical Exam:    General:    NAD, non toxic, A&O x3  HEENT:   no oropharyngeal lesions, no LAD, neck supple  Cardiovascular:    regular S1,S2, no murmur  Respiratory:   clear b/l, no wheezing  abd:   soft, BS +, not tender, no hepatosplenomegaly  :     no CVAT, no spurapubic tenderness, no de luna  Musculoskeletal : no joint swelling, no edema  Skin:    no rash  Neurologic:     no focal deficits      Drug Dosing Weight  Height (cm): 162.56 (2018 23:02)  Weight (kg): 61.2 (2018 23:02)  BMI (kg/m2): 23.2 (2018 23:02)  BSA (m2): 1.65 (2018 23:02)    Vital Signs Last 24 Hrs  T(F): 99 (18 @ 06:31), Max: 99.7 (18 @ 19:21)    Vital Signs Last 24 Hrs  HR: 86 (18 @ 06:31) (65 - 96)  BP: 112/75 (18 @ 06:31) (104/66 - 139/88)  RR: 18 (18 @ 06:31)  SpO2: 98% (18 @ 06:31) (98% - 100%)  Wt(kg): --                          13.4   5.32  )-----------( 190      ( 2018 11:50 )             41.5           139  |  101  |  11  ----------------------------<  95  4.0   |  24  |  0.81    Ca    9.2      2018 11:50    TPro  7.6  /  Alb  4.7  /  TBili  0.4  /  DBili  x   /  AST  13  /  ALT  12  /  AlkPhos  47        Urinalysis Basic - ( 2018 11:56 )    Color: YELLOW / Appearance: CLEAR / S.027 / pH: 6.0  Gluc: NEGATIVE / Ketone: SMALL  / Bili: NEGATIVE / Urobili: NORMAL mg/dL   Blood: TRACE / Protein: 20 mg/dL / Nitrite: NEGATIVE   Leuk Esterase: NEGATIVE / RBC: 5-10 / WBC 2-5   Sq Epi: OCC / Non Sq Epi: x / Bacteria: FEW        MICROBIOLOGY:  v  CEREBRAL SPINAL FLUID  18  94.9 protein   42 glucose  294 cell count, 85% lymphocyte  2 RBC    CSF gram stain: negative  CSF VRDL: pending    Upper respiratory viral panel: negative    UA: normal, 2-5 WBC      RADIOLOGY:   CT head no con :    IMPRESSION:    No acute intracranial findings.    MR head no con :    IMPRESSION:         No acute intracranial abnormality    MR angio brain :    IMPRESSION:         Unremarkable head/brain MRA. HPI:  31 y/o F, previously healthy, p/w with isolated right sided headache down to upper neck and fever up to 102.8F with associated poor appetite and photophobia since .  She initially presented to the ED , found to have negative CT head, received IVF and Toradol, and sent  home with a diagnosis of cluster headache. Symptoms persisted and she re-presented on  to the ED, where MRI had and MRA head were both normal. Patient declined LP at that time, and was sent home again. Pt continues to have nonresolving headache and went to the [facility] on .   In the ED, pt given Reglan, Toradol and Tylenol. She was afebrile, non-tachy, and normotensive. LP was performed that showed elevated protein (94.9) with normal glucose (42), elevated cell count to 294 (85% lymphocytes) and negative gram stain.   Patient otherwise denies rhinorrhea, ear ache, neck stiffness, upper respiratory symptoms, GI,  complaints, new skin rash nor myalgia. She denies recent travel (was in [place] 8 month ago), h/o lyme, rodent exposure, sick contacts nor new medications.     PAST MEDICAL & SURGICAL HISTORY:  Calculus of kidney  Bunion  Hallux valgus  S/P foot surgery, right: Lapidus   S/P cystoscopy: lithotripsy  for right kidney stone  Inguinal hernia, right: repair, 2003    Allergies    cefuroxime (Rash)  Cipro (Rash)  Diflucan (Rash)  Sudafed (Other)    Intolerances    ANTIMICROBIALS:      OTHER MEDS:  acetaminophen   Tablet. 650 milliGRAM(s) Oral every 6 hours PRN  sodium chloride 0.9%. 1000 milliLiter(s) IV Continuous <Continuous>      SOCIAL HISTORY:    Marital Status:  (  X )    (  ) Single    (   )    (  )   Occupation:   Lives with: (  ) alone  (  ) children   ( X ) spouse   (  ) parents  (  ) other    Substance Use (street drugs): ( X ) never used  (  ) other:  Tobacco Usage:  ( X ) never smoked   (   ) former smoker   (   ) current smoker  (     ) pack year  (        ) last cigarette date  Alcohol Usage: Denies EtOH use    FAMILY HISTORY:  Family history of ischemic heart disease (Grandparent)  Family history of diabetes mellitus (Grandparent, Aunt)      ROS:  Unobtainable because:   All other systems negative     Constitutional: fever and chills upon presentation, no weight loss, no night sweats  HEENT:  no vision changes, no sore throat, no rhinorrhea  Cardiovascular:  no chest pain, no palpitation  Respiratory:  no SOB, no cough  GI:  no abd pain, no vomiting, no diarrhea, endorses to nausea and poor appetite  urinary: no dysuria, no hematuria, no flank pain  musculoskeletal:  no joint pain, no joint swelling  skin:  no rash  neurology:  no seizure, no change in mental status    Physical Exam:    General:    NAD, non toxic, A&O x3  HEENT:   no oropharyngeal lesions, no LAD, neck supple  Cardiovascular:    regular S1,S2, no murmur  Respiratory:   clear b/l, no wheezing  abd:   soft, BS +, not tender, no hepatosplenomegaly  :     no CVAT, no spurapubic tenderness, no de luna  Musculoskeletal : no joint swelling, no edema  Skin:    no rash  Neurologic:     no focal deficits      Drug Dosing Weight  Height (cm): 162.56 (2018 23:02)  Weight (kg): 61.2 (2018 23:02)  BMI (kg/m2): 23.2 (2018 23:02)  BSA (m2): 1.65 (2018 23:02)    Vital Signs Last 24 Hrs  T(F): 99 (18 @ 06:31), Max: 99.7 (18 @ 19:21)    Vital Signs Last 24 Hrs  HR: 86 (18 @ 06:31) (65 - 96)  BP: 112/75 (18 @ 06:31) (104/66 - 139/88)  RR: 18 (18 @ 06:31)  SpO2: 98% (18 @ 06:31) (98% - 100%)  Wt(kg): --                          13.4   5.32  )-----------( 190      ( 2018 11:50 )             41.5           139  |  101  |  11  ----------------------------<  95  4.0   |  24  |  0.81    Ca    9.2      2018 11:50    TPro  7.6  /  Alb  4.7  /  TBili  0.4  /  DBili  x   /  AST  13  /  ALT  12  /  AlkPhos  47        Urinalysis Basic - ( 2018 11:56 )    Color: YELLOW / Appearance: CLEAR / S.027 / pH: 6.0  Gluc: NEGATIVE / Ketone: SMALL  / Bili: NEGATIVE / Urobili: NORMAL mg/dL   Blood: TRACE / Protein: 20 mg/dL / Nitrite: NEGATIVE   Leuk Esterase: NEGATIVE / RBC: 5-10 / WBC 2-5   Sq Epi: OCC / Non Sq Epi: x / Bacteria: FEW        MICROBIOLOGY:  v  CEREBRAL SPINAL FLUID  18  94.9 protein   42 glucose  294 cell count, 85% lymphocyte  2 RBC    CSF gram stain: negative  CSF VRDL: pending    Upper respiratory viral panel: negative    UA: normal, 2-5 WBC      RADIOLOGY:   CT head no con :    IMPRESSION:    No acute intracranial findings.    MR head no con :    IMPRESSION:         No acute intracranial abnormality    MR angio brain :    IMPRESSION:         Unremarkable head/brain MRA. HPI:  31 y/o F, previously healthy, p/w with isolated right sided headache down to upper neck and fever up to 102.8F with associated poor appetite and photophobia since . She initially presented to the ED , found to have negative CT head, received IVF & Toradol, and sent  home with a diagnosis of cluster headache. Symptoms persisted and she re-presented on , where MRI head and MRA head were both normal. Pt continues to have nonresolving headache and went to the [facility] again on .     In the ED, pt given Reglan, Toradol and Tylenol. She was afebrile, non-tachy, and normotensive. LP was performed.   Patient otherwise denies rhinorrhea, ear ache, neck stiffness, upper respiratory symptoms, GI,  complaints, new skin rash nor myalgia. She denies recent travel (was in [place] 8 month ago), h/o lyme, rodent exposure, sick contacts nor new medications.     Vitals: spiked fevers up to 102.8 at home, and has been afebrile (98.8) since admission  Labs: CSF showed elevated protein (94.9) with normal glucose (42), elevated cell count to 294 (85% lymphocytes) and negative gram stain. CSF PCR negative. Normal LFTs. Lyme, anaplasma and babesia negative.    Patient has allergy to cefuroxime cipro and diflucan. She reports getting white heads across face with cefuroxime and cipro, and hives with diflucan.    Patient denies family history of autoimmune diseases. No TB contact    Born in [facility] and lives in [place] with . Monogenous.     PAST MEDICAL & SURGICAL HISTORY:  Calculus of kidney  Bunion  Hallux valgus  S/P foot surgery, right: Lapidus 2012  S/P cystoscopy: lithotripsy  for right kidney stone  Inguinal hernia, right: repair, 2003    Allergies    cefuroxime (Rash)  Cipro (Rash)  Diflucan (Rash)  Sudafed (Other)    Intolerances    ANTIMICROBIALS:      OTHER MEDS:  acetaminophen   Tablet. 650 milliGRAM(s) Oral every 6 hours PRN  sodium chloride 0.9%. 1000 milliLiter(s) IV Continuous <Continuous>      SOCIAL HISTORY:    Marital Status:  (  X )    (  ) Single    (   )    (  )   Occupation:   Lives with: (  ) alone  (  ) children   ( X ) spouse   (  ) parents  (  ) other    Substance Use (street drugs): ( X ) never used  (  ) other:  Tobacco Usage:  ( X ) never smoked   (   ) former smoker   (   ) current smoker  (     ) pack year  (        ) last cigarette date  Alcohol Usage: Denies EtOH use    FAMILY HISTORY:  Family history of ischemic heart disease (Grandparent)  Family history of diabetes mellitus (Grandparent, Aunt)      ROS:  Unobtainable because:   All other systems negative     Constitutional: fever and chills upon presentation, no weight loss, no night sweats  HEENT:  no vision changes, no sore throat, no rhinorrhea  Cardiovascular:  no chest pain, no palpitation  Respiratory:  no SOB, no cough  GI:  no abd pain, no vomiting, no diarrhea, endorses to nausea and poor appetite  urinary: no dysuria, no hematuria, no flank pain  musculoskeletal:  no joint pain, no joint swelling  skin:  no rash  neurology:  no seizure, no change in mental status    Physical Exam:    General:    NAD, non toxic, A&O x3  HEENT:   no oropharyngeal lesions, no LAD, neck supple  Cardiovascular:    regular S1,S2, no murmur  Respiratory:   clear b/l, no wheezing  abd:   soft, BS +, not tender, no hepatosplenomegaly  :     no CVAT, no spurapubic tenderness, no de luna  Musculoskeletal : no joint swelling, no edema  Skin:    no rash  Neurologic:     no focal deficits      Drug Dosing Weight  Height (cm): 162.56 (2018 23:02)  Weight (kg): 61.2 (2018 23:02)  BMI (kg/m2): 23.2 (2018 23:02)  BSA (m2): 1.65 (2018 23:02)    Vital Signs Last 24 Hrs  T(F): 99 (18 @ 06:31), Max: 99.7 (18 @ 19:21)    Vital Signs Last 24 Hrs  HR: 86 (18 @ 06:31) (65 - 96)  BP: 112/75 (18 @ 06:31) (104/66 - 139/88)  RR: 18 (18 @ 06:31)  SpO2: 98% (18 @ 06:31) (98% - 100%)  Wt(kg): --                          13.4   5.32  )-----------( 190      ( 2018 11:50 )             41.5           139  |  101  |  11  ----------------------------<  95  4.0   |  24  |  0.81    Ca    9.2      2018 11:50    TPro  7.6  /  Alb  4.7  /  TBili  0.4  /  DBili  x   /  AST  13  /  ALT  12  /  AlkPhos  47        Urinalysis Basic - ( 2018 11:56 )    Color: YELLOW / Appearance: CLEAR / S.027 / pH: 6.0  Gluc: NEGATIVE / Ketone: SMALL  / Bili: NEGATIVE / Urobili: NORMAL mg/dL   Blood: TRACE / Protein: 20 mg/dL / Nitrite: NEGATIVE   Leuk Esterase: NEGATIVE / RBC: 5-10 / WBC 2-5   Sq Epi: OCC / Non Sq Epi: x / Bacteria: FEW        MICROBIOLOGY:  v  CEREBRAL SPINAL FLUID  18  94.9 protein   42 glucose  294 cell count, 85% lymphocyte  2 RBC    CSF gram stain: negative  CSF VDRL: pending    CSF PCR: negative    Lyme, anaplasma, babesia serology: negative    Upper respiratory viral panel: negative    UA: normal, 2-5 WBC      RADIOLOGY:   CT head no con :    IMPRESSION:    No acute intracranial findings.    MR head no con :    IMPRESSION:         No acute intracranial abnormality    MR angio brain :    IMPRESSION:         Unremarkable head/brain MRA.

## 2018-06-29 ENCOUNTER — TRANSCRIPTION ENCOUNTER (OUTPATIENT)
Age: 33
End: 2018-06-29

## 2018-06-29 LAB
BUN SERPL-MCNC: 10 MG/DL — SIGNIFICANT CHANGE UP (ref 7–23)
CALCIUM SERPL-MCNC: 8.8 MG/DL — SIGNIFICANT CHANGE UP (ref 8.4–10.5)
CHLORIDE SERPL-SCNC: 102 MMOL/L — SIGNIFICANT CHANGE UP (ref 98–107)
CO2 SERPL-SCNC: 23 MMOL/L — SIGNIFICANT CHANGE UP (ref 22–31)
CREAT SERPL-MCNC: 0.7 MG/DL — SIGNIFICANT CHANGE UP (ref 0.5–1.3)
GLUCOSE SERPL-MCNC: 90 MG/DL — SIGNIFICANT CHANGE UP (ref 70–99)
HCT VFR BLD CALC: 38.4 % — SIGNIFICANT CHANGE UP (ref 34.5–45)
HGB BLD-MCNC: 12.5 G/DL — SIGNIFICANT CHANGE UP (ref 11.5–15.5)
MCHC RBC-ENTMCNC: 29.6 PG — SIGNIFICANT CHANGE UP (ref 27–34)
MCHC RBC-ENTMCNC: 32.6 % — SIGNIFICANT CHANGE UP (ref 32–36)
MCV RBC AUTO: 91 FL — SIGNIFICANT CHANGE UP (ref 80–100)
NRBC # FLD: 0 — SIGNIFICANT CHANGE UP
PLATELET # BLD AUTO: 181 K/UL — SIGNIFICANT CHANGE UP (ref 150–400)
PMV BLD: 11.6 FL — SIGNIFICANT CHANGE UP (ref 7–13)
POTASSIUM SERPL-MCNC: 4 MMOL/L — SIGNIFICANT CHANGE UP (ref 3.5–5.3)
POTASSIUM SERPL-SCNC: 4 MMOL/L — SIGNIFICANT CHANGE UP (ref 3.5–5.3)
RBC # BLD: 4.22 M/UL — SIGNIFICANT CHANGE UP (ref 3.8–5.2)
RBC # FLD: 12.2 % — SIGNIFICANT CHANGE UP (ref 10.3–14.5)
SODIUM SERPL-SCNC: 138 MMOL/L — SIGNIFICANT CHANGE UP (ref 135–145)
SPECIMEN SOURCE: SIGNIFICANT CHANGE UP
VDRL CSF-TITR: NEGATIVE — SIGNIFICANT CHANGE UP
WBC # BLD: 5.99 K/UL — SIGNIFICANT CHANGE UP (ref 3.8–10.5)
WBC # FLD AUTO: 5.99 K/UL — SIGNIFICANT CHANGE UP (ref 3.8–10.5)

## 2018-06-29 PROCEDURE — 99232 SBSQ HOSP IP/OBS MODERATE 35: CPT | Mod: GC

## 2018-06-29 RX ORDER — SENNA PLUS 8.6 MG/1
2 TABLET ORAL AT BEDTIME
Qty: 0 | Refills: 0 | Status: DISCONTINUED | OUTPATIENT
Start: 2018-06-29 | End: 2018-07-02

## 2018-06-29 RX ORDER — ACETAMINOPHEN 500 MG
650 TABLET ORAL EVERY 6 HOURS
Qty: 0 | Refills: 0 | Status: DISCONTINUED | OUTPATIENT
Start: 2018-06-29 | End: 2018-07-02

## 2018-06-29 RX ADMIN — Medication 650 MILLIGRAM(S): at 18:58

## 2018-06-29 RX ADMIN — Medication 400 MILLIGRAM(S): at 00:00

## 2018-06-29 RX ADMIN — Medication 650 MILLIGRAM(S): at 13:18

## 2018-06-29 RX ADMIN — SENNA PLUS 2 TABLET(S): 8.6 TABLET ORAL at 23:12

## 2018-06-29 NOTE — DISCHARGE NOTE ADULT - MEDICATION SUMMARY - MEDICATIONS TO STOP TAKING
I will STOP taking the medications listed below when I get home from the hospital:    acetaminophen 650 mg oral tablet, extended release  -- 1 tab(s) by mouth every 6 hours   -- This product contains acetaminophen.  Do not use  with any other product containing acetaminophen to prevent possible liver damage.    Fioricet oral capsule  -- 1 cap(s) by mouth every 4 hours, As Needed -for headache   -- Do not drink alcoholic beverages when taking this medication.  This drug may impair the ability to drive or operate machinery.  Use care until you become familiar with its effects.  This product contains acetaminophen.  Do not use  with any other product containing acetaminophen to prevent possible liver damage.

## 2018-06-29 NOTE — PROGRESS NOTE ADULT - ATTENDING COMMENTS
Aseptic meningitis in 31 yo woman who has had fever and headache x 9 days following allergy skin testing.    CSF PCR neg.  CSF culture testing (no growth to date).  Low grade fever and body aches today.  h/o multiple antibiotic allergies.    check HIV, [place], syphilis, crypt serology  follow CSF culture  BRAN  consider non infectious etiologies- neuro eval    ID service will follow over weekend. Aseptic meningitis in 31 yo woman who has had fever and headache x 9 days following allergy skin testing.    CSF PCR neg.  CSF culture testing (no growth to date).  Low grade fever and body aches today.  h/o multiple antibiotic allergies.    check HIV, [place], syphilis, crypt, quant gold serology  follow CSF culture  BRAN  consider non infectious etiologies- neuro eval  CXR    ID service will follow over weekend.

## 2018-06-29 NOTE — DISCHARGE NOTE ADULT - PATIENT PORTAL LINK FT
You can access the [facility] Patient Portal, offered by [facility], by registering with the following website: [email or web address]

## 2018-06-29 NOTE — DISCHARGE NOTE ADULT - CARE PROVIDER_API CALL
Marzena Post), Internal Medicine  [address] [place] [address]  Phone: [phone]  Fax: [phone]

## 2018-06-29 NOTE — DISCHARGE NOTE ADULT - CARE PLAN
Principal Discharge DX:	Aseptic meningitis  Goal:	Resolution and return to baseline  Assessment and plan of treatment:	**____ Principal Discharge DX:	Aseptic meningitis  Goal:	Resolution and return to baseline  Assessment and plan of treatment:	You were seen by infectious disease and neurology who evaluated your labs and recommended to observe off of antibiotics. You may take Tylenol for fever/headache and follow-up outpatient with your PCP to review the rest of your labs and for further care/recommendations Principal Discharge DX:	Aseptic meningitis  Goal:	Resolution and return to baseline  Assessment and plan of treatment:	You were seen by infectious disease and neurology who evaluated your labs and recommended to observe off of antibiotics. You may take Tylenol for fever/headache and follow-up outpatient with your PCP and Dr. Everett to review the rest of your labs and for further care/recommendations Principal Discharge DX:	Aseptic meningitis  Goal:	Resolution and return to baseline  Assessment and plan of treatment:	You were seen by infectious disease and neurology who evaluated your labs and recommended to observe off of antibiotics. You may take Tylenol for fever/headache and follow-up outpatient with your PCP and Dr. Everett to review the rest of your labs and for further care/recommendations. Return to ED if symptoms persist.

## 2018-06-29 NOTE — PROGRESS NOTE ADULT - SUBJECTIVE AND OBJECTIVE BOX
Follow Up:      Interval History:    ROS:    Unobtainable because:  All other systems negative    Constitutional: no fever, no chills  HEENT:  no vision changes, no sore throat, no rhinorrhea  Cardiovascular:  no chest pain, no palpitation  Respiratory:  no SOB, no cough  GI:  no abd pain, no vomiting, no diarrhea  urinary: no dysuria, no hematuria, no flank pain  musculoskeletal:  no joint pain, no joint swelling  skin:  no rash  neurology:  no headache, no seizure, no change in mental status      Allergies  cefuroxime (Rash)  Cipro (Rash)  Diflucan (Rash)  Sudafed (Other)        ANTIMICROBIALS:      OTHER MEDS:  acetaminophen   Tablet 650 milliGRAM(s) Oral every 6 hours PRN  acetaminophen   Tablet. 650 milliGRAM(s) Oral every 6 hours PRN      Vital Signs Last 24 Hrs  T(C): 38.2 (29 Jun 2018 12:59), Max: 38.3 (28 Jun 2018 14:39)  T(F): 100.7 (29 Jun 2018 12:59), Max: 101 (28 Jun 2018 14:39)  HR: 84 (29 Jun 2018 12:06) (82 - 91)  BP: 149/97 (29 Jun 2018 12:06) (106/64 - 149/97)  BP(mean): --  RR: 18 (29 Jun 2018 12:06) (16 - 19)  SpO2: 99% (29 Jun 2018 12:06) (99% - 100%)    Physical Exam:  General:    NAD,  non toxic, A&O x 3  HEENT:    no oropharyngeal lesions,   no LAD,   neck supple  Cardiovascular:     regular S1, S2,  no murmur  Respiratory:    clear b/l,    no wheezing  abd:     soft,   BS +,   no tenderness,    no organomegaly  :   no CVAT,  no suprapubic tenderness,   no  de luna  Musculoskeletal:   no joint swelling,   no edema  Skin:    no rash  Neurologic:     no focal deficit                          12.5   5.99  )-----------( 181      ( 29 Jun 2018 05:55 )             38.4       06-29    138  |  102  |  10  ----------------------------<  90  4.0   |  23  |  0.70    Ca    8.8      29 Jun 2018 05:55            MICROBIOLOGY:  v  CEREBRAL SPINAL FLUID  06-27-18 --  --  --                RADIOLOGY: Follow Up:  Patient continues to feel well. Minimum headache. Denies cough, cheat pain, n/v, diarrhea, dysuria, muscle/joint ache    Interval History: No acute event overnight    ROS:    Unobtainable because:  All other systems negative    Constitutional: no fever, no chills  HEENT:  no vision changes, no sore throat, no rhinorrhea  Cardiovascular:  no chest pain, no palpitation  Respiratory:  no SOB, no cough  GI:  no abd pain, no vomiting, no diarrhea  urinary: no dysuria, no hematuria, no flank pain  musculoskeletal:  no joint pain, no joint swelling  skin:  no rash  neurology:  no headache, no seizure, no change in mental status      Allergies  cefuroxime (Rash)  Cipro (Rash)  Diflucan (Rash)  Sudafed (Other)    ANTIMICROBIALS:      OTHER MEDS:  acetaminophen   Tablet 650 milliGRAM(s) Oral every 6 hours PRN  acetaminophen   Tablet. 650 milliGRAM(s) Oral every 6 hours PRN    Vital Signs Last 24 Hrs  T(C): 38.2 (29 Jun 2018 12:59), Max: 38.3 (28 Jun 2018 14:39)  T(F): 100.7 (29 Jun 2018 12:59), Max: 101 (28 Jun 2018 14:39)  HR: 84 (29 Jun 2018 12:06) (82 - 91)  BP: 149/97 (29 Jun 2018 12:06) (106/64 - 149/97)  BP(mean): --  RR: 18 (29 Jun 2018 12:06) (16 - 19)  SpO2: 99% (29 Jun 2018 12:06) (99% - 100%)    Physical Exam:  General:    NAD,  non toxic, A&O x 3  HEENT:    no oropharyngeal lesions,   no LAD,   neck supple  Cardiovascular:     regular S1, S2,  no murmur  Respiratory:    clear b/l,    no wheezing  abd:     soft,   BS +,   no tenderness,    no organomegaly  :   no CVAT,  no suprapubic tenderness,   no  de luna  Musculoskeletal:   no joint swelling,   no edema  Skin:    no rash  Neurologic:     no focal deficit                          12.5   5.99  )-----------( 181      ( 29 Jun 2018 05:55 )             38.4       06-29    138  |  102  |  10  ----------------------------<  90  4.0   |  23  |  0.70    Ca    8.8      29 Jun 2018 05:55            MICROBIOLOGY:  MICROBIOLOGY:  v  CEREBRAL SPINAL FLUID  06-27-18  94.9 protein   42 glucose  294 cell count, 85% lymphocyte  2 RBC    CSF gram stain: negative  CSF VDRL: pending    CSF PCR: negative    Lyme, anaplasma, babesia serology: negative    Upper respiratory viral panel: negative    UA: normal, 2-5 WBC      RADIOLOGY:   CT head no con 6/22:    IMPRESSION:    No acute intracranial findings.    MR head no con 6/24:    IMPRESSION:         No acute intracranial abnormality    MR angio brain 6/24:    IMPRESSION:         Unremarkable head/brain MRA.

## 2018-06-29 NOTE — PROGRESS NOTE ADULT - SUBJECTIVE AND OBJECTIVE BOX
Residual HA this morning with mild photophobia  100.7 temp after 1 PM    Vital Signs Last 24 Hrs  T(C): 38.2 (29 Jun 2018 12:59), Max: 38.3 (28 Jun 2018 14:39)  T(F): 100.7 (29 Jun 2018 12:59), Max: 101 (28 Jun 2018 14:39)  HR: 84 (29 Jun 2018 12:06) (82 - 91)  BP: 149/97 (29 Jun 2018 12:06) (106/64 - 149/97)  BP(mean): --  RR: 18 (29 Jun 2018 12:06) (16 - 19)  SpO2: 99% (29 Jun 2018 12:06) (99% - 100%)    GENERAL: NAD, AAOx3  HEAD:  Atraumatic, Normocephalic  EYES: EOMI, PERRLA, conjunctiva and sclera clear  NECK: Supple, No JVD, No LAD  CHEST/LUNG: Clear to auscultation bilaterally; No wheeze  HEART: Regular rate and rhythm; No murmurs, rubs, or gallops  ABDOMEN: Soft, Nontender, Nondistended; Bowel sounds present  EXTREMITIES:  2+ Peripheral Pulses, No clubbing, cyanosis, or edema  SKIN: No rashes or lesions  NEURO: nonfocal CN/motor/sensory/reflexes    LABS:                        12.5   5.99  )-----------( 181      ( 29 Jun 2018 05:55 )             38.4     06-29    138  |  102  |  10  ----------------------------<  90  4.0   |  23  |  0.70    Ca    8.8      29 Jun 2018 05:55        CAPILLARY BLOOD GLUCOSE

## 2018-06-29 NOTE — DISCHARGE NOTE ADULT - MEDICATION SUMMARY - MEDICATIONS TO TAKE
I will START or STAY ON the medications listed below when I get home from the hospital:    acetaminophen 325 mg oral tablet  -- 2 tab(s) by mouth every 6 hours, As needed, For Temp greater than 38 C (100.4 F)  -- Indication: For Fever    acetaminophen 325 mg oral tablet  -- 2 tab(s) by mouth every 6 hours, As needed, mild, moderate, or severe pain  -- Indication: For Pain/Headache

## 2018-06-29 NOTE — DISCHARGE NOTE ADULT - PLAN OF CARE
Resolution and return to baseline **____ You were seen by infectious disease and neurology who evaluated your labs and recommended to observe off of antibiotics. You may take Tylenol for fever/headache and follow-up outpatient with your PCP to review the rest of your labs and for further care/recommendations You were seen by infectious disease and neurology who evaluated your labs and recommended to observe off of antibiotics. You may take Tylenol for fever/headache and follow-up outpatient with your PCP and Dr. Everett to review the rest of your labs and for further care/recommendations You were seen by infectious disease and neurology who evaluated your labs and recommended to observe off of antibiotics. You may take Tylenol for fever/headache and follow-up outpatient with your PCP and Dr. Everett to review the rest of your labs and for further care/recommendations. Return to ED if symptoms persist.

## 2018-06-29 NOTE — DISCHARGE NOTE ADULT - HOSPITAL COURSE
31 y/o F with no PMH p/w headache and fever for the past week.  CSF analysis shows 300 WBC (85% lymphs) and negative gram stain.  Possibly viral meningitis.      HOSPITAL COURSE 31 y/o F with no PMH p/w headache and fever for the past week.  CSF analysis shows 300 WBC (85% lymphs) and negative gram stain.  Possibly viral meningitis.   Aseptic meningitis  - Likely viral meningitis  - F/u CSF culture and serologies  - RVP negative  - Monitor off antibiotics  - ID and neurology consulted - no further recommendations     Dispo - Home

## 2018-06-30 LAB
BUN SERPL-MCNC: 11 MG/DL — SIGNIFICANT CHANGE UP (ref 7–23)
CALCIUM SERPL-MCNC: 9.1 MG/DL — SIGNIFICANT CHANGE UP (ref 8.4–10.5)
CHLORIDE SERPL-SCNC: 100 MMOL/L — SIGNIFICANT CHANGE UP (ref 98–107)
CO2 SERPL-SCNC: 23 MMOL/L — SIGNIFICANT CHANGE UP (ref 22–31)
CREAT SERPL-MCNC: 0.75 MG/DL — SIGNIFICANT CHANGE UP (ref 0.5–1.3)
GLUCOSE SERPL-MCNC: 88 MG/DL — SIGNIFICANT CHANGE UP (ref 70–99)
HCT VFR BLD CALC: 40.1 % — SIGNIFICANT CHANGE UP (ref 34.5–45)
HGB BLD-MCNC: 13.3 G/DL — SIGNIFICANT CHANGE UP (ref 11.5–15.5)
HIV 1+2 AB+HIV1 P24 AG SERPL QL IA: SIGNIFICANT CHANGE UP
MCHC RBC-ENTMCNC: 29.8 PG — SIGNIFICANT CHANGE UP (ref 27–34)
MCHC RBC-ENTMCNC: 33.2 % — SIGNIFICANT CHANGE UP (ref 32–36)
MCV RBC AUTO: 89.7 FL — SIGNIFICANT CHANGE UP (ref 80–100)
NRBC # FLD: 0 — SIGNIFICANT CHANGE UP
PLATELET # BLD AUTO: 200 K/UL — SIGNIFICANT CHANGE UP (ref 150–400)
PMV BLD: 11.4 FL — SIGNIFICANT CHANGE UP (ref 7–13)
POTASSIUM SERPL-MCNC: 4.1 MMOL/L — SIGNIFICANT CHANGE UP (ref 3.5–5.3)
POTASSIUM SERPL-SCNC: 4.1 MMOL/L — SIGNIFICANT CHANGE UP (ref 3.5–5.3)
RBC # BLD: 4.47 M/UL — SIGNIFICANT CHANGE UP (ref 3.8–5.2)
RBC # FLD: 12.1 % — SIGNIFICANT CHANGE UP (ref 10.3–14.5)
SODIUM SERPL-SCNC: 138 MMOL/L — SIGNIFICANT CHANGE UP (ref 135–145)
WBC # BLD: 5.64 K/UL — SIGNIFICANT CHANGE UP (ref 3.8–10.5)
WBC # FLD AUTO: 5.64 K/UL — SIGNIFICANT CHANGE UP (ref 3.8–10.5)

## 2018-06-30 PROCEDURE — 99232 SBSQ HOSP IP/OBS MODERATE 35: CPT

## 2018-06-30 NOTE — PROGRESS NOTE ADULT - SUBJECTIVE AND OBJECTIVE BOX
CC: F/U for fever    Saw/spoke to patient. Well appearing. Fever yesterday. No new complaints today. No headaches or neck stiffness at present time.    Allergies  cefuroxime (Rash)  Cipro (Rash)  Diflucan (Rash)  Sudafed (Other)    ANTIMICROBIALS:  Off    PE:    Vital Signs Last 24 Hrs  T(C): 37.5 (30 Jun 2018 06:21), Max: 38.2 (29 Jun 2018 12:59)  T(F): 99.5 (30 Jun 2018 06:21), Max: 100.7 (29 Jun 2018 12:59)  HR: 93 (30 Jun 2018 06:21) (80 - 97)  BP: 104/64 (30 Jun 2018 06:21) (103/66 - 149/97)  RR: 18 (30 Jun 2018 06:21) (18 - 19)  SpO2: 98% (30 Jun 2018 06:21) (98% - 100%)    Gen: AOx3, NAD, non-toxic, pleasant  HEENT: No neck stiffness  CV: S1+S2 normal, no murmurs, nontachycardic  Resp: Clear bilat, no resp distress, no crackles/wheezes  Abd: Soft, nontender, +BS  Ext: No LE edema, no wounds    LABS:                        13.3   5.64  )-----------( 200      ( 30 Jun 2018 05:30 )             40.1     06-30    138  |  100  |  11  ----------------------------<  88  4.1   |  23  |  0.75    Ca    9.1      30 Jun 2018 05:30    MICROBIOLOGY:    CEREBRAL SPINAL FLUID  06-27-18 NGTD    CSF PCR Neg    CSF protein 94.9  Glucose CSF 54   cells (lymph predominant)    RADIOLOGY:    6/24 MR:    IMPRESSION:         No acute intracranial abnormality

## 2018-06-30 NOTE — PROGRESS NOTE ADULT - SUBJECTIVE AND OBJECTIVE BOX
Still some low-grade temps lingering intermittently  Mild dry cough started yesterday    Vital Signs Last 24 Hrs  T(C): 37.5 (30 Jun 2018 06:21), Max: 38.2 (29 Jun 2018 12:59)  T(F): 99.5 (30 Jun 2018 06:21), Max: 100.7 (29 Jun 2018 12:59)  HR: 93 (30 Jun 2018 06:21) (80 - 97)  BP: 104/64 (30 Jun 2018 06:21) (103/66 - 149/97)  BP(mean): --  RR: 18 (30 Jun 2018 06:21) (18 - 19)  SpO2: 98% (30 Jun 2018 06:21) (98% - 100%)    GENERAL: NAD, AAOx3  HEAD:  Atraumatic, Normocephalic  EYES: EOMI, PERRLA, conjunctiva and sclera clear  NECK: Supple, No JVD, No LAD  CHEST/LUNG: Clear to auscultation bilaterally; No wheeze  HEART: Regular rate and rhythm; No murmurs, rubs, or gallops  ABDOMEN: Soft, Nontender, Nondistended; Bowel sounds present  EXTREMITIES:  2+ Peripheral Pulses, No clubbing, cyanosis, or edema  SKIN: No rashes or lesions  NEURO: nonfocal CN/motor/sensory/reflexes    LABS:                        13.3   5.64  )-----------( 200      ( 30 Jun 2018 05:30 )             40.1     06-30    138  |  100  |  11  ----------------------------<  88  4.1   |  23  |  0.75    Ca    9.1      30 Jun 2018 05:30        CAPILLARY BLOOD GLUCOSE

## 2018-06-30 NOTE — PROGRESS NOTE ADULT - PROBLEM SELECTOR PLAN 1
LP studies c/w viral meningitis  - monitor off antibiotics  tylenol for fever  serologies sent this morning  would like to hold off CXR unless symptoms/signs worsen  appreciate ID

## 2018-07-01 RX ADMIN — SENNA PLUS 2 TABLET(S): 8.6 TABLET ORAL at 21:09

## 2018-07-01 NOTE — PROVIDER CONTACT NOTE (OTHER) - SITUATION
Pt IV expires today, not receiving any IV medications. Pt. states she does not want new IV unless absolutely necessary. Can pt. be without IV access?

## 2018-07-01 NOTE — PROGRESS NOTE ADULT - ATTENDING COMMENTS
Likely dc planning in am if all work up neg and once cleared by ID/neuro.     - Dr. NORA Alcantar ([facility])  - [phone]

## 2018-07-01 NOTE — PROGRESS NOTE ADULT - SUBJECTIVE AND OBJECTIVE BOX
Patient is a 32y old  Female who presents with a chief complaint of Headache (29 Jun 2018 16:11)      SUBJECTIVE / OVERNIGHT EVENTS:  Pt seen and examined at bedside. Feeling better.  Resolving headaches. no vision changes. no neck stiffness.   No chest pain, no shortness of breath.   No overnight event.   No N/V/D. No abdominal pain.       Vital Signs Last 24 Hrs  T(C): 36.9 (01 Jul 2018 05:17), Max: 37.6 (30 Jun 2018 21:12)  T(F): 98.4 (01 Jul 2018 05:17), Max: 99.6 (30 Jun 2018 21:12)  HR: 98 (01 Jul 2018 05:17) (95 - 100)  BP: 101/55 (01 Jul 2018 05:17) (96/65 - 112/63)  BP(mean): --  RR: 18 (01 Jul 2018 05:17) (17 - 18)  SpO2: 98% (01 Jul 2018 05:17) (98% - 100%)  I&O's Summary    30 Jun 2018 07:01  -  01 Jul 2018 07:00  --------------------------------------------------------  IN: 720 mL / OUT: 0 mL / NET: 720 mL        PHYSICAL EXAM:  GENERAL: NAD, Comfortable  HEAD:  Atraumatic, Normocephalic  EYES: EOMI, PERRLA, conjunctiva and sclera clear  NECK: Supple, No JVD  CHEST/LUNG: Clear to auscultation bilaterally; No wheeze  HEART: Regular rate and rhythm; No murmurs, rubs, or gallops  ABDOMEN: Soft, Nontender, Nondistended; Bowel sounds present  Neuro: AAO x 3, no focal deficit, 5/5 b/l extremities  EXTREMITIES:  2+ Peripheral Pulses, No clubbing, cyanosis, or edema  SKIN: No rashes or lesions    LABS:                        13.3   5.64  )-----------( 200      ( 30 Jun 2018 05:30 )             40.1     06-30    138  |  100  |  11  ----------------------------<  88  4.1   |  23  |  0.75    Ca    9.1      30 Jun 2018 05:30        CAPILLARY BLOOD GLUCOSE                RADIOLOGY & ADDITIONAL TESTS:    Imaging Personally Reviewed:  [x] YES  [ ] NO    Consultant(s) Notes Reviewed:  [x] YES  [ ] NO      MEDICATIONS  (STANDING):  senna 2 Tablet(s) Oral at bedtime    MEDICATIONS  (PRN):  acetaminophen   Tablet 650 milliGRAM(s) Oral every 6 hours PRN For Temp greater than 38 C (100.4 F)  acetaminophen   Tablet. 650 milliGRAM(s) Oral every 6 hours PRN mild, moderate, or severe pain      Care Discussed with Consultants/Other Providers [x] YES  [ ] NO    HEALTH ISSUES - PROBLEM Dx:  Aseptic meningitis: Aseptic meningitis

## 2018-07-01 NOTE — PROGRESS NOTE ADULT - PROBLEM SELECTOR PLAN 1
LP studies c/w viral meningitis  - monitor off antibiotics, afebrile for two days so far.   Last fever on the 6/29th  tylenol for fever  [place] serologies sent.   neg HIV, neg tick borne serologies, neg VDRL CSF   Ordered BRAN, crypt Ag, QuantiFeron, syphilis   ID f/u.   Neg CT head, neg MRi  Neuro eval per ID. Dr. Harrington consulted.

## 2018-07-02 VITALS
TEMPERATURE: 98 F | HEART RATE: 95 BPM | OXYGEN SATURATION: 99 % | DIASTOLIC BLOOD PRESSURE: 66 MMHG | SYSTOLIC BLOOD PRESSURE: 106 MMHG | RESPIRATION RATE: 18 BRPM

## 2018-07-02 LAB — T PALLIDUM AB TITR SER: NEGATIVE — SIGNIFICANT CHANGE UP

## 2018-07-02 PROCEDURE — 99232 SBSQ HOSP IP/OBS MODERATE 35: CPT

## 2018-07-02 RX ORDER — ACETAMINOPHEN 500 MG
2 TABLET ORAL
Qty: 0 | Refills: 0 | COMMUNITY
Start: 2018-07-02

## 2018-07-02 NOTE — PROGRESS NOTE ADULT - SUBJECTIVE AND OBJECTIVE BOX
Patient is a 32y old  Female who presents with a chief complaint of Headache (29 Jun 2018 16:11)      SUBJECTIVE / OVERNIGHT EVENTS:  No overnight events.  Pt feels well. no Headache. resolved.   Denied cp, sob, n/v/d.  no abdominal pain. No HA/dizziness.   wants to go home. Doesn't want anymore blood work.       Vital Signs Last 24 Hrs  T(C): 37.1 (02 Jul 2018 06:55), Max: 37.5 (01 Jul 2018 21:05)  T(F): 98.7 (02 Jul 2018 06:55), Max: 99.5 (01 Jul 2018 21:05)  HR: 80 (02 Jul 2018 06:55) (80 - 99)  BP: 102/60 (02 Jul 2018 06:55) (101/65 - 106/64)  BP(mean): --  RR: 18 (02 Jul 2018 06:55) (17 - 20)  SpO2: 100% (02 Jul 2018 06:55) (100% - 100%)  I&O's Summary    01 Jul 2018 07:01  -  02 Jul 2018 07:00  --------------------------------------------------------  IN: 720 mL / OUT: 0 mL / NET: 720 mL        PHYSICAL EXAM:  GENERAL: NAD, Comfortable, sitting up, eating breakfast.   HEAD:  Atraumatic, Normocephalic  EYES: EOMI, PERRLA, conjunctiva and sclera clear  NECK: Supple, No JVD  CHEST/LUNG: Clear to auscultation bilaterally; No wheeze  HEART: Regular rate and rhythm; No murmurs, rubs, or gallops  ABDOMEN: Soft, Nontender, Nondistended; Bowel sounds present  Neuro: AAO x 3, no focal deficit, 5/5 b/l extremities  EXTREMITIES:  2+ Peripheral Pulses, No clubbing, cyanosis, or edema  SKIN: No rashes or lesions    LABS:            CAPILLARY BLOOD GLUCOSE                RADIOLOGY & ADDITIONAL TESTS:    Imaging Personally Reviewed:  [x] YES  [ ] NO    Consultant(s) Notes Reviewed:  [x] YES  [ ] NO      MEDICATIONS  (STANDING):  senna 2 Tablet(s) Oral at bedtime    MEDICATIONS  (PRN):  acetaminophen   Tablet 650 milliGRAM(s) Oral every 6 hours PRN For Temp greater than 38 C (100.4 F)  acetaminophen   Tablet. 650 milliGRAM(s) Oral every 6 hours PRN mild, moderate, or severe pain      Care Discussed with Consultants/Other Providers [x] YES  [ ] NO    HEALTH ISSUES - PROBLEM Dx:  Aseptic meningitis: Aseptic meningitis

## 2018-07-02 NOTE — CONSULT NOTE ADULT - SUBJECTIVE AND OBJECTIVE BOX
[facility]        Patient is a 32y old  Female who presents with a chief complaint of Headache (29 Jun 2018 16:11)      HPI:  31 y/o F with no PMH p/w headache and fever for the past week.  Pt has had occipital headache and upper neck pain for the past week.  She has also noticed fever.  She had nausea without vomiting, and mild photophobia.  She initially presented to the ED 6/22 and had blood work and CT head done which were normal.  She was discharged after symptoms improved with Toradol and IVF.  She had continued symptoms and returned to ED 6/24.  MRI done that visit was normal.  She had declined LP at that time.  Symptoms persisted, and she returned today.  Pt has not had any travel or sick contacts.  No preceding URI symptoms.  No h/o recurrent infections.  Denies neck stiffness.  On the day symptoms began, pt had allergy skin testing 2/2 occasional rash.     headache associated with photophobia, nausea. pt with no recent exposure. 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     headache resolved since friday. She has not photosensitivity, phonosensitivity.         *****PAST MEDICAL / Surgical  HISTORY:  PAST MEDICAL & SURGICAL HISTORY:  Calculus of kidney  Bunion  Hallux valgus  S/P foot surgery, right: Lapidus 2012  S/P cystoscopy: lithotripsy 2014 for right kidney stone  Inguinal hernia, right: repair, 07/2003           *****FAMILY HISTORY:  FAMILY HISTORY:  Family history of ischemic heart disease (Grandparent)  Family history of diabetes mellitus (Grandparent, Aunt)           *****SOCIAL HISTORY:  Alcohol: None  Smoking: None         *****ALLERGIES:   Allergies    cefuroxime (Rash)  Cipro (Rash)  Diflucan (Rash)  Sudafed (Other)    Intolerances             *****MEDICATIONS: current medication reviewed and documented.   MEDICATIONS  (STANDING):  senna 2 Tablet(s) Oral at bedtime    MEDICATIONS  (PRN):  acetaminophen   Tablet 650 milliGRAM(s) Oral every 6 hours PRN For Temp greater than 38 C (100.4 F)  acetaminophen   Tablet. 650 milliGRAM(s) Oral every 6 hours PRN mild, moderate, or severe pain           *****REVIEW OF SYSTEM:  GEN: no fever, no chills, no pain  RESP: no SOB, no cough, no sputum  CVS: no chest pain, no palpitations, no edema  GI: no abdominal pain, no nausea, no vomiting, no constipation, no diarrhea  : no dysurea, no frequency, no hematurea  Neuro: no headache, no dizziness  PSYCH: no anxiety, no depression  Derm : no itching, no rash         *****VITAL SIGNS:  T(C): 36.7 (07-02-18 @ 11:23), Max: 37.5 (07-01-18 @ 21:05)  HR: 95 (07-02-18 @ 11:23) (80 - 97)  BP: 106/66 (07-02-18 @ 11:23) (101/65 - 106/66)  RR: 18 (07-02-18 @ 11:23) (17 - 20)  SpO2: 99% (07-02-18 @ 11:23) (99% - 100%)  Wt(kg): --    07-01 @ 07:01  -  07-02 @ 07:00  --------------------------------------------------------  IN: 720 mL / OUT: 0 mL / NET: 720 mL             *****PHYSICAL EXAM:     Alert oriented x 3   Attention comprehension are fair. Able to name, repeat, read without any difficulty.   Able to follow 3 step commands.     EOMI fundi not visualized,  VFF to confrontration  No facial asymmetry   Tongue is midline   Palate elevates symmetrically   Moving all 4 ext symmetrically no pronator drift.  Reflexes are symmetric throughout   sensation is grossly symmetric     B/L down going toes   neg kernigs or brudinksi,   able to tandem forward and backward.     >>> <<<         *****LAB AND IMAGING:                                  Culture - CSF:   NO GROWTH - PRELIMINARY RESULTS  NO ORGANISMS ISOLATED AT 24 HOURS  NO ORGANISMS ISOLATED AT 48 HRS.  NO ORGANISMS ISOLATED AT 72 HRS.  NO GROWTH AFTER 4 DAYS INCUBATION (06.27.18 @ 17:59)    Lymphocyte Count, CSF: 85 % (06.27.18 @ 14:27)    VDRL Titer, CSF (06.27.18 @ 14:27)    VDRL Titer, CSF: Negative: Test Performed by:  Total Nucleated Cell Count, CSF: 294 cell/uL (06.27.18 @ 14:27)    [facility]  [address] [place] [address]                < from: MR Head No Cont (06.24.18 @ 17:49) >    Brain:  Unremarkable.  No mass.  No hemorrhage.  No acute infarct.    Ventricles:  Unremarkable.  No ventriculomegaly. Moderate size cava   septum pellucidum and cavum vergae.    Bones/joints:  Unremarkable.    Sinuses:  Minimal ethmoid sinus disease.  No acute sinusitis.    Mastoid air cells:  Unremarkable as visualized.  No mastoid effusion.    Orbits:  Unremarkable as visualized.  < from: MR Angio Head No Cont (06.24.18 @ 17:49) >    Right internal carotid artery:  No acute findings.  Intracranial   segment is patent with no significant stenosis.  No aneurysm.    Right anterior cerebral artery:  Unremarkable.  No occlusion or   significant stenosis.  No aneurysm.    Right middle cerebral artery:  Unremarkable.  No occlusion or   significant stenosis.  No aneurysm.    Right posterior cerebral artery:  Unremarkable.  No occlusion or   significant stenosis.  No aneurysm.    Right vertebral artery:  Unremarkable as visualized.      Left internal carotid artery:  No acute findings.  Intracranial segment   is patent with no significant stenosis.  No aneurysm.    Left anterior cerebral artery:  Unremarkable.  No occlusion or   significant stenosis.  No aneurysm.    Left middle cerebral artery:  Unremarkable.  No occlusion or   significant stenosis.  No aneurysm.    Left posterior cerebral artery:  Unremarkable.  No occlusion or   significant stenosis.  No aneurysm.    Left vertebral artery:  Unremarkable as visualized.      < end of copied text >    IMPRESSION:         No acute intracranial abnormality. Preliminary report provided by JOSE ALBERTO BLAKELY M.D.;[facility] RADIOLOGIST   .    < end of copied text >      [All pertinent recent Imaging reports reviewed]    Protein, CSF: 94.9 mg/dL (06.27.18 @ 14:27)    Glucose, CSF: 42 mg/dL (06.27.18 @ 14:27)         *****A S S E S S M E N T   A N D   P L A N :     31 yo F, previously healthy, presents with 8 day history of unilateral headache with associated fever, nausea and photophobia.   CSF with elevated WBC, lymphocytes; elevated protein  CSF culture/PCR Negative (no antibiotics taken PTA)  Symptomatically improving off antibiotics  Aseptic meningitis  no infectious etiology identified as yet.    non infectious etiologies in ddx.  Spinal tap results as above.   MRI did not reveal any abn although no contrast was given.  MRA without any intracranial vascular lesions.  ? cytology sent on csf, unable to add on at this point.   resolving aseptic meningitis, management per id. pt does not appear toxic to exam at this point.   pt instructed to return to hospital if symptoms recur.   pt is being discharged today. pt instructed about follow up Dr. Everett in 1-2 weeks.  Pt was also instructed about post lp headache and mother who is neurosurgical nurse is aware of symptoms.      80 minutes spent on the total encounter;  more than 50 % of the visit was spent on counseling  on diagnosis and expectations. and or coordinating care by the attending physician.    Thank you for allowing me to participate in the care of this raoul patient. Please do not hesitate to call me if you have any questions.   ___________________________  Will follow with you.  Thank you,  Tory Harrington MD  Diplomate of the American Board of Neurology and Psychiatry.  Diplomate of the American Board of Vascular Neurology.   [facility] ([facility]), [facility]   Ph: [phone]      This and subsequent notes were partially created using voice recognition software and will  inherently be subject to errors including those of syntax and sound alike substitutions which may escape proofreading. In such instances original meaning may be extrapolated by contextual derivation.

## 2018-07-02 NOTE — PROGRESS NOTE ADULT - ASSESSMENT
31 y/o F p/w persistent headache and fever 2' viral meningitis.
31 y/o F p/w persistent headache and fever.  CSF analysis shows cell count ~300 with lymphocyte predominance, high protein and normal glucose.  No bacteria see on gram stain.
33 y/o F p/w persistent headache and fever 2' viral meningitis
33 y/o F p/w persistent headache and fever 2' viral meningitis
33 y/o F p/w persistent headache and fever 2' viral meningitis.
33 yo F, previously healthy, presents with 8 day history of unilateral headache with associated fever, nausea and photophobia.   CSF with elevated WBC, lymphocytes; elevated protein  CSF culture/PCR Negative  Symptomatically improving off antibiotics  Aseptic mengingitis--viral vs automimmune/other occult  - Continue off abx  - F/U HIV, WNV  - Check syphilis, crypt, quant gold, BRAN  - Further workup for non-infectious causes per primary team, consider neuro eval    Serafin Shrestha MD  Pager [phone]  After 5pm and on weekends call [phone]
33 yo F, previously healthy, presents with 8 day history of unilateral headache with associated fever, nausea and photophobia.   CSF with elevated WBC, lymphocytes; elevated protein  CSF culture/PCR Negative (no antibiotics taken PTA)  Symptomatically improving off antibiotics  Aseptic meningitis  no infectious etiology identified as yet.    non infectious etiologies in ddx. neuro to evaluate.    - Continue off abx  - f/u  WNV  - f/u  colleen stoddard ANA Barbara Edwards MD  Pager: [phone]  After 5 PM or weekends please call fellow on call or office [phone]
Assessment: 33 yo F, previously healthy, presents with 8 day history of unilateral headache with associated fever, nausea and photophobia. Now found to have CSF consistent with aseptic meningitis of unclear source. CSF PCR negative. Infectious (?), autoimmune (?).     -HIV, [place], crypto, serology  -f/u CSF VDRL  -BRAN  -Neurology consult - evaluate for non-infectious etiologies

## 2018-07-02 NOTE — PROGRESS NOTE ADULT - PROBLEM SELECTOR PLAN 1
LP studies c/w viral meningitis  - monitor off antibiotics, afebrile for two days so far.   Last fever on the 6/29th  tylenol for fever  [place] serologies sent.   neg HIV, neg tick borne serologies, neg VDRL CSF   Ordered BRAN, crypt Ag, QuantiFeron, syphilis   Neg CT head, neg MRI  Neuro eval per ID. Dr. Harrington consulted.  ID f/u today.   Maybe able to go home with outpt follow up.   She sees PCP Dr. Post at [facility].

## 2018-07-02 NOTE — PROGRESS NOTE ADULT - SUBJECTIVE AND OBJECTIVE BOX
CC: F/U for fever    feels better.  100.3   mother at bedside.    Allergies  cefuroxime (Rash)  Cipro (Rash)  Diflucan (Rash)  Sudafed (Other)    ANTIMICROBIALS:  Off    PE:    Vital Signs Last 24 Hrs  T(F): 98.7 (07-02-18 @ 06:55), Max: 99.5 (07-01-18 @ 21:05)  HR: 80 (07-02-18 @ 06:55)  BP: 102/60 (07-02-18 @ 06:55)  RR: 18 (07-02-18 @ 06:55)  SpO2: 100% (07-02-18 @ 06:55) (100% - 100%)    Gen: AOx3, NAD, non-toxic, pleasant  HEENT: No neck stiffness  CV: S1+S2 normal, no murmurs, nontachycardic  Resp: Clear bilat, no resp distress, no crackles/wheezes  Abd: Soft, nontender, +BS  Ext: No LE edema, no wounds    LABS:                 MICROBIOLOGY:    CEREBRAL SPINAL FLUID  06-27-18 No growth    CSF PCR Neg    CSF protein 94.9  Glucose CSF 54   cells (lymph predominant)    RADIOLOGY:    6/24 MR:    IMPRESSION:         No acute intracranial abnormality

## 2018-07-03 LAB
BACTERIA CSF CULT: SIGNIFICANT CHANGE UP
M TB TUBERC IFN-G BLD QL: -0.01 IU/ML — SIGNIFICANT CHANGE UP
M TB TUBERC IFN-G BLD QL: 0.02 IU/ML — SIGNIFICANT CHANGE UP
M TB TUBERC IFN-G BLD QL: NEGATIVE — SIGNIFICANT CHANGE UP
MITOGEN IGNF BCKGRD COR BLD-ACNC: >10 IU/ML — SIGNIFICANT CHANGE UP

## 2018-07-04 LAB
CRYPTOC AG FLD QL: NEGATIVE — SIGNIFICANT CHANGE UP
CULTURE - ACID FAST SMEAR CONCENTRATED: SIGNIFICANT CHANGE UP
SPECIMEN SOURCE: SIGNIFICANT CHANGE UP

## 2018-07-05 LAB
ANA PAT FLD IF-IMP: SIGNIFICANT CHANGE UP
ANA PATTERN 2: SIGNIFICANT CHANGE UP
ANA TITR SER: SIGNIFICANT CHANGE UP
ANTI NUCLEAR FACTOR TITER 2: SIGNIFICANT CHANGE UP
WNV IGG TITR FLD: NEGATIVE — SIGNIFICANT CHANGE UP

## 2018-07-06 ENCOUNTER — TRANSCRIPTION ENCOUNTER (OUTPATIENT)
Age: 33
End: 2018-07-06

## 2018-07-06 LAB — WNV IGM SPEC QL: NEGATIVE — SIGNIFICANT CHANGE UP

## 2018-07-30 LAB — FUNGUS SPEC QL CULT: SIGNIFICANT CHANGE UP

## 2018-08-08 LAB — ACID FAST STN FLD: SIGNIFICANT CHANGE UP

## 2019-06-09 NOTE — H&P PST ADULT - CONSTITUTIONAL DETAILS
Skin normal color for race, warm, dry and intact. No evidence of rash. well-developed/well-groomed/no distress/well-nourished

## 2020-05-11 NOTE — H&P ADULT - PROBLEM SELECTOR PLAN 1
Signed by Dr.Leah Resendiz.   Possible viral meningitis  - f/u CSF culture and serologies   - RVP sent  - monitor off antibiotics  - prn Tylenol  - ID eval in am

## 2023-10-30 NOTE — ASU PREOP CHECKLIST - TEMPERATURE IN CELSIUS (DEGREES C)
36.7 Communicate Risk of Fall with Harm to all staff/Reinforce activity limits and safety measures with patient and family/Tailored Fall Risk Interventions/Visual Cue: Yellow wristband and red socks/Bed in lowest position, wheels locked, appropriate side rails in place/Call bell, personal items and telephone in reach/Instruct patient to call for assistance before getting out of bed or chair/Non-slip footwear when patient is out of bed/[place] to call system/Physically safe environment - no spills, clutter or unnecessary equipment/Purposeful Proactive Rounding/Room/bathroom lighting operational, light cord in reach

## 2024-09-25 NOTE — PROVIDER CONTACT NOTE (OTHER) - REASON
Please call back once orders are placed      -- DO NOT REPLY / DO NOT REPLY ALL --  -- This inbox is not monitored. If this was sent to the wrong provider or department, reroute message to P ECO Reroute pool. --  -- Message is from Engagement Center Operations (ECO) --    Order Request  Lab: Urine    Message / reason: spoke with pulmonary doctor about bladder infection pain 7/10 and diarrhea and was told to call PCP to get labs done-offered appt patient declined. Sending high priority as patient needs ASAP    Insurance type: Medicare A and B / [facility]  Payor: MEDICARE / Plan: PARTA AND B / Product Type: MEDICARE    Preferred Delivery Method  Input in Epic     Caller Information       Contact Date/Time Type Contact Phone/Fax    09/25/2024 11:44 AM CDT Phone (Incoming) David Gonzalez (Self) [phone] (M)            Alternative phone number: n/a    Can a detailed message be left? Yes - Voicemail   Patient has been advised the message will be addressed within 2-3 business days.       Pt febrile
